# Patient Record
Sex: FEMALE | Race: WHITE | Employment: FULL TIME | URBAN - METROPOLITAN AREA
[De-identification: names, ages, dates, MRNs, and addresses within clinical notes are randomized per-mention and may not be internally consistent; named-entity substitution may affect disease eponyms.]

---

## 2017-01-05 ENCOUNTER — HOSPITAL ENCOUNTER (OUTPATIENT)
Dept: LAB | Age: 62
Discharge: HOME | End: 2017-01-05
Payer: COMMERCIAL

## 2017-01-05 ENCOUNTER — OFFICE VISIT (OUTPATIENT)
Dept: GYNECOLOGY | Age: 62
End: 2017-01-05

## 2017-01-05 VITALS
WEIGHT: 209.6 LBS | BODY MASS INDEX: 32.83 KG/M2 | HEART RATE: 76 BPM | SYSTOLIC BLOOD PRESSURE: 129 MMHG | DIASTOLIC BLOOD PRESSURE: 88 MMHG

## 2017-01-05 DIAGNOSIS — C54.1 ENDOMETRIAL CANCER (HCC): Primary | ICD-10-CM

## 2017-01-05 PROCEDURE — 88175 CYTOPATH C/V AUTO FLUID REDO: CPT | Performed by: OBSTETRICS & GYNECOLOGY

## 2017-01-05 NOTE — PROGRESS NOTES
6 month check up, pt reports no abnormal spotting or bleeding, pt states she has no questions or concerns for today's visit

## 2017-01-05 NOTE — PROGRESS NOTES
27 [facility] [address], [address] [facility]  [phone]  MD Rafael Eng MD  Office Visit    Patient ID:  Name: Elizabeth Keane  MRN: [id number]  : 1955/61 y.o. Visit date: 2017    Primary Diagnosis:     ICD-10-CM ICD-9-CM    1. Endometrial cancer ([facility] 75.) C54.1 182.0 PAP, IG, RFX HPV ASCUS ([id number])        Problem List:    Patient Active Problem List   Diagnosis Code    Endometrial cancer ([facility] C54.1       INTERVAL HISTORY:  Elizabeth Keane is a  female with a history of stage I, grade 2 endometrial carcinoma, diagnosed in 2013, when she underwent TLH, BSO, staging. She was not recommended any adjuvant therapy. She presents today for routine surveillance. She is doing well and is without complaints. She denies any vaginal bleeding or discharge, any pelvic or abdominal pain, or any changes in her bowel or bladder habits. PMH:  Past Medical History   Diagnosis Date    Alopecia     Arthritis      shoulders    Asthma     Cancer ([facility]      ENDOMETRIAL    Edema     Nausea & vomiting     Polyp of colon     Thyroid disease      hypothyroidism     PSH:  Past Surgical History   Procedure Laterality Date    Endoscopy, colon, diagnostic  3/2010    Endoscopy, colon, diagnostic       3cm polyp    Hx cholecystectomy      Hx heent       tonsillectomy    Hx heent       NASAL SURGERY    Hx gyn           Hx gyn       btl    Hx gyn  13     hysteroscopy/D&C    Hx gyn  10/7/13     TLH/BSO    Hx colonoscopy  6/3/15     X1 polyp     SOC:  Social History     Social History    Marital status:      Spouse name: N/A    Number of children: N/A    Years of education: N/A     Occupational History    Not on file. Social History Main Topics    Smoking status: Never Smoker    Smokeless tobacco: Never Used    Alcohol use Yes      Comment: RARELY    Drug use:  No  Sexual activity: Yes     Partners: Male     Other Topics Concern    Not on file     Social History Narrative     Family History  Family History   Problem Relation Age of Onset    Cancer Paternal Grandfather      COLON    Diabetes Father     Heart Disease Father     Hypertension Sister     Diabetes Sister     Lung Disease Sister      Avis Chambers    Thyroid Disease Daughter      Medications:  Current Outpatient Prescriptions on File Prior to Visit   Medication Sig Dispense Refill    Thyroid, Pork, 120 mg tab Take 120 mg by mouth daily.  Cholecalciferol, Vitamin D3, 5,000 unit Tab Take  by mouth.  spironolactone (ALDACTONE) 50 mg tablet Take  by mouth daily.  cyanocobalamin (VITAMIN B-12) 1,000 mcg tablet Take 1,000 mcg by mouth daily.  Thyroid, Pork, (ARMOUR THYROID) 240 mg Tab Take  by mouth daily. No current facility-administered medications on file prior to visit. Allergies: Allergies   Allergen Reactions    Demerol [Meperidine] Other (comments)     nightmares    Erythromycin Itching       ROS:  Negative except that mentioned in HPI    OBJECTIVE:    PHYSICAL EXAM  VITAL SIGNS: Vitals:    01/05/17 1442   BP: 129/88   Pulse: 76   Weight: 209 lb 9.6 oz (95.1 kg)      GENERAL FAUZIA: Conversant, alert, oriented. NAD   HEENT: Normocephalic. Oropharynx clear. Neck: Trachea midline, no JVD, no thyroid enlargement   RESPIRATORY: Lung fields clear to auscultation and percussion. Adequate respiratory effort   CARDIOVASC: RRR without murmur   GASTROINT: soft, non-tender, without masses or organomegaly. No hernia noted   MUSCULOSKEL: Within normal limits   EXTREMITIES: extremities normal, atraumatic, no cyanosis or edema. Pulses appreciated.    PELVIC: External genitalia: normal general appearance  Urinary system: urethral meatus normal  Vaginal: normal without tenderness, induration or masses  Cervix: absent  Adnexa: removed surgically  Uterus: absent   RECTAL: Deferred   AXEL SURVEY: Negative throughout---neck, axillae, inguina. NEURO: Grossly intact, no acute deficit. Oriented. Not depressed. Not agitated. IMPRESSION AND PLAN:  Johnny Albrecht has a diagnosis of stage I, grade 2 endometrial cancer. She has no evidence of disease on today's exam  We will follow up on today's pap smear and I will see her back in the office in 6 months for continued surveillance.       Beverly Schroeder MD  1/5/2017/4:09 PM

## 2017-07-18 ENCOUNTER — HOSPITAL ENCOUNTER (OUTPATIENT)
Dept: LAB | Age: 62
Discharge: HOME | End: 2017-07-18
Payer: COMMERCIAL

## 2017-07-18 ENCOUNTER — OFFICE VISIT (OUTPATIENT)
Dept: GYNECOLOGY | Age: 62
End: 2017-07-18

## 2017-07-18 VITALS
SYSTOLIC BLOOD PRESSURE: 121 MMHG | HEART RATE: 72 BPM | WEIGHT: 214.6 LBS | DIASTOLIC BLOOD PRESSURE: 76 MMHG | BODY MASS INDEX: 33.61 KG/M2

## 2017-07-18 DIAGNOSIS — C54.1 ENDOMETRIAL CANCER (HCC): Primary | ICD-10-CM

## 2017-07-18 PROCEDURE — 88175 CYTOPATH C/V AUTO FLUID REDO: CPT | Performed by: OBSTETRICS & GYNECOLOGY

## 2017-07-18 NOTE — PROGRESS NOTES
27 [facility] [address], [address] [facility]  [phone]  MD Carlos Almaguer MD  Office Visit    Patient ID:  Name: Mick Moore  MRN: [id number]  : 1955/62 y.o. Visit date: 2017    Primary Diagnosis:     ICD-10-CM ICD-9-CM    1. Endometrial cancer ([facility] 75.) C54.1 182.0         Problem List:    Patient Active Problem List   Diagnosis Code    [facility] ([facility] C54.1       INTERVAL HISTORY:  Mick Moore is a  female with a history of stage I, grade 2 endometrial carcinoma, diagnosed in 2013, when she underwent TLH, BSO, staging. She was not recommended any adjuvant therapy. She presents today for routine surveillance. She is doing well and is without complaints. She denies any vaginal bleeding or discharge, any pelvic or abdominal pain, or any changes in her bowel or bladder habits. PMH:  Past Medical History:   Diagnosis Date    Alopecia     Arthritis     shoulders    Asthma     Cancer ([facility]     ENDOMETRIAL    Edema     Nausea & vomiting     Polyp of colon     Thyroid disease     hypothyroidism     PSH:  Past Surgical History:   Procedure Laterality Date    ENDOSCOPY, COLON, DIAGNOSTIC  3/2010    ENDOSCOPY, COLON, DIAGNOSTIC      3cm polyp    HX CHOLECYSTECTOMY      HX COLONOSCOPY  6/3/15    X1 polyp    HX GYN          HX GYN      btl    HX GYN  13    hysteroscopy/D&C    HX GYN  10/7/13    TLH/BSO    HX HEENT      tonsillectomy    HX HEENT      NASAL SURGERY     SOC:  Social History     Social History    Marital status:      Spouse name: N/A    Number of children: N/A    Years of education: N/A     Occupational History    Not on file.      Social History Main Topics    Smoking status: Never Smoker    Smokeless tobacco: Never Used    Alcohol use Yes      Comment: RARELY    Drug use: No    Sexual activity: Yes     Partners: Male     Other Topics Concern    Not on file     Social History Narrative     Family History  Family History   Problem Relation Age of Onset    Cancer Paternal Grandfather      COLON    Diabetes Father     Heart Disease Father     Hypertension Sister     Diabetes Sister     Lung Disease Sister      Vish Shields    Thyroid Disease Daughter      Medications:  Current Outpatient Prescriptions on File Prior to Visit   Medication Sig Dispense Refill    Thyroid, Pork, 120 mg tab Take 120 mg by mouth daily.  cyanocobalamin (VITAMIN B-12) 1,000 mcg tablet Take 1,000 mcg by mouth daily.  Cholecalciferol, Vitamin D3, 5,000 unit Tab Take  by mouth.  spironolactone (ALDACTONE) 50 mg tablet Take  by mouth daily.  Thyroid, Pork, (ARMOUR THYROID) 240 mg Tab Take  by mouth daily. No current facility-administered medications on file prior to visit. Allergies: Allergies   Allergen Reactions    Demerol [Meperidine] Other (comments)     nightmares    Erythromycin Itching       ROS:  Negative except that mentioned in HPI    OBJECTIVE:    PHYSICAL EXAM  VITAL SIGNS: Vitals:    07/18/17 1450   BP: 121/76   Pulse: 72   Weight: 97.3 kg (214 lb 9.6 oz)      GENERAL FAUZIA: Conversant, alert, oriented. NAD   HEENT: Normocephalic. Oropharynx clear. Neck: Trachea midline, no JVD, no thyroid enlargement   RESPIRATORY: Lung fields clear to auscultation and percussion. Adequate respiratory effort   CARDIOVASC: RRR without murmur   GASTROINT: soft, non-tender, without masses or organomegaly. No hernia noted   MUSCULOSKEL: Within normal limits   EXTREMITIES: extremities normal, atraumatic, no cyanosis or edema. Pulses appreciated.    PELVIC: External genitalia: normal general appearance  Urinary system: urethral meatus normal  Vaginal: normal without tenderness, induration or masses  Cervix: absent  Adnexa: removed surgically  Uterus: absent   RECTAL: Deferred   AXEL SURVEY: Negative throughout---neck, axillae, inguina. NEURO: Grossly intact, no acute deficit. Oriented. Not depressed. Not agitated. IMPRESSION AND PLAN:  Roberto Alba has a diagnosis of stage I, grade 2 endometrial cancer. She has no evidence of disease on today's exam  We will follow up on today's pap smear and I will see her back in the office in 6 months for continued surveillance.       Damien Reece., MD  7/18/2017/4:09 PM

## 2018-02-13 ENCOUNTER — HOSPITAL ENCOUNTER (OUTPATIENT)
Dept: LAB | Age: 63
Discharge: HOME | End: 2018-02-13
Payer: COMMERCIAL

## 2018-02-13 ENCOUNTER — OFFICE VISIT (OUTPATIENT)
Dept: GYNECOLOGY | Age: 63
End: 2018-02-13

## 2018-02-13 VITALS
SYSTOLIC BLOOD PRESSURE: 133 MMHG | HEART RATE: 74 BPM | WEIGHT: 208.6 LBS | HEIGHT: 67 IN | BODY MASS INDEX: 32.74 KG/M2 | DIASTOLIC BLOOD PRESSURE: 84 MMHG

## 2018-02-13 DIAGNOSIS — C54.1 ENDOMETRIAL CANCER (HCC): Primary | ICD-10-CM

## 2018-02-13 PROCEDURE — 88175 CYTOPATH C/V AUTO FLUID REDO: CPT | Performed by: OBSTETRICS & GYNECOLOGY

## 2018-02-13 NOTE — PROGRESS NOTES
27 [facility] [address], [address] [facility]  [phone]  MD Vanessa Meade MD  Office Visit    Patient ID:  Name: Neto Santiago  MRN: [id number]  : 1955/62 y.o. Visit date: 2018    Primary Diagnosis:     ICD-10-CM ICD-9-CM    1. Endometrial cancer ([facility].) C54.1 182.0 PAP, IG, RFX HPV ASCUS ([id number])        Problem List:    Patient Active Problem List   Diagnosis Code    [facility] C54.1       INTERVAL HISTORY:  Neto Santiago is a  female with a history of stage I, grade 2 endometrial carcinoma, diagnosed in 2013, when she underwent TLH, BSO, staging. She was not recommended any adjuvant therapy. She presents today for routine surveillance. She is doing well and is without complaints. She denies any vaginal bleeding or discharge, any pelvic or abdominal pain, or any changes in her bowel or bladder habits. PMH:  Past Medical History:   Diagnosis Date    Alopecia     Arthritis     shoulders    Asthma     Cancer ([facility]     ENDOMETRIAL    Edema     Nausea & vomiting     Polyp of colon     Thyroid disease     hypothyroidism     PSH:  Past Surgical History:   Procedure Laterality Date    ENDOSCOPY, COLON, DIAGNOSTIC  3/2010    ENDOSCOPY, COLON, DIAGNOSTIC      3cm polyp    HX CHOLECYSTECTOMY      HX COLONOSCOPY  6/3/15    X1 polyp    HX GYN          HX GYN      btl    HX GYN  13    hysteroscopy/D&C    HX GYN  10/7/13    TLH/BSO    HX HEENT      tonsillectomy    HX HEENT      NASAL SURGERY     SOC:  Social History     Social History    Marital status:      Spouse name: N/A    Number of children: N/A    Years of education: N/A     Occupational History    Not on file.      Social History Main Topics    Smoking status: Never Smoker    Smokeless tobacco: Never Used    Alcohol use Yes      Comment: RARELY    Drug use: No    Sexual activity: Yes     Partners: Male     Other Topics Concern    Not on file     Social History Narrative     Family History  Family History   Problem Relation Age of Onset    Cancer Paternal Grandfather      COLON    Diabetes Father     Heart Disease Father     Hypertension Sister     Diabetes Sister     Lung Disease Sister      Jimbo Ho    Thyroid Disease Daughter      Medications:  Current Outpatient Prescriptions on File Prior to Visit   Medication Sig Dispense Refill    Thyroid, Pork, 120 mg tab Take 120 mg by mouth daily.  cyanocobalamin (VITAMIN B-12) 1,000 mcg tablet Take 1,000 mcg by mouth daily.  Cholecalciferol, Vitamin D3, 5,000 unit Tab Take  by mouth.  spironolactone (ALDACTONE) 50 mg tablet Take  by mouth daily.  Thyroid, Pork, (ARMOUR THYROID) 240 mg Tab Take  by mouth daily. No current facility-administered medications on file prior to visit. Allergies: Allergies   Allergen Reactions    Demerol [Meperidine] Other (comments)     nightmares    Erythromycin Itching       ROS:  Negative except that mentioned in HPI    OBJECTIVE:    PHYSICAL EXAM  VITAL SIGNS: Vitals:    02/13/18 1536   BP: 133/84   Pulse: 74   Weight: 208 lb 9.6 oz (94.6 kg)   Height: 5' 7.01\" (1.702 m)      GENERAL FAUZIA: Conversant, alert, oriented. NAD   HEENT: Normocephalic. Oropharynx clear. Neck: Trachea midline, no JVD, no thyroid enlargement   RESPIRATORY: Lung fields clear to auscultation and percussion. Adequate respiratory effort   CARDIOVASC: RRR without murmur   GASTROINT: soft, non-tender, without masses or organomegaly. No hernia noted   MUSCULOSKEL: Within normal limits   EXTREMITIES: extremities normal, atraumatic, no cyanosis or edema. Pulses appreciated.    PELVIC: External genitalia: normal general appearance  Urinary system: urethral meatus normal  Vaginal: normal without tenderness, induration or masses  Cervix: absent  Adnexa: removed surgically  Uterus: absent   RECTAL: Deferred   AXEL SURVEY: Negative throughout---neck, axillae, inguina. NEURO: Grossly intact, no acute deficit. Oriented. Not depressed. Not agitated. IMPRESSION AND PLAN:  Sera Rosas has a diagnosis of stage I, grade 2 endometrial cancer. She has no evidence of disease on today's exam  We will follow up on today's pap smear and I will see her back in the office in 6 months for continued surveillance.       Nestor Robbins MD  2/13/2018/4:09 PM

## 2018-09-13 ENCOUNTER — HOSPITAL ENCOUNTER (OUTPATIENT)
Dept: LAB | Age: 63
Discharge: HOME | End: 2018-09-13
Payer: COMMERCIAL

## 2018-09-13 ENCOUNTER — OFFICE VISIT (OUTPATIENT)
Dept: GYNECOLOGY | Age: 63
End: 2018-09-13

## 2018-09-13 VITALS
SYSTOLIC BLOOD PRESSURE: 135 MMHG | DIASTOLIC BLOOD PRESSURE: 86 MMHG | HEIGHT: 67 IN | BODY MASS INDEX: 32.65 KG/M2 | WEIGHT: 208 LBS | HEART RATE: 74 BPM

## 2018-09-13 DIAGNOSIS — C54.1 ENDOMETRIAL CANCER (HCC): Primary | ICD-10-CM

## 2018-09-13 PROCEDURE — 88175 CYTOPATH C/V AUTO FLUID REDO: CPT | Performed by: OBSTETRICS & GYNECOLOGY

## 2018-09-13 NOTE — PROGRESS NOTES
Six month check up. Patient states no abnormal spotting or bleeding. 1. Have you been to the ER, urgent care clinic since your last visit? Hospitalized since your last visit? No    2. Have you seen or consulted any other health care providers outside of the [address] [place] [address] since your last visit? Include any pap smears or colon screening.  No

## 2018-09-13 NOTE — PROGRESS NOTES
27 [facility] [address], [address] [facility]  [phone]  MD Sumi Hoffman MD  Office Visit    Patient ID:  Name: Johnny Albrecht  MRN: [id number]  :  y.o. Visit date: 2018    Primary Diagnosis:     ICD-10-CM ICD-9-CM    1. Endometrial cancer ([facility] 75.) C54.1 182.0 PAP, IG, RFX HPV ASCUS ([id number])        Problem List:    Patient Active Problem List   Diagnosis Code    Endometrial cancer ([facility].) C54.1       INTERVAL HISTORY:  Johnny Albrecht is a  female with a history of stage I, grade 2 endometrial carcinoma, diagnosed in 2013, when she underwent TLH, BSO, staging. She was not recommended any adjuvant therapy. She presents today for routine surveillance. She is doing well and is without complaints. She denies any vaginal bleeding or discharge, any pelvic or abdominal pain, or any changes in her bowel or bladder habits. PMH:  Past Medical History:   Diagnosis Date    Alopecia     Arthritis     shoulders    Asthma     [facility] ([facility]     ENDOMETRIAL    Edema     Nausea & vomiting     Polyp of colon     Thyroid disease     hypothyroidism     PSH:  Past Surgical History:   Procedure Laterality Date    ENDOSCOPY, COLON, DIAGNOSTIC  3/2010    ENDOSCOPY, COLON, DIAGNOSTIC      3cm polyp    HX CHOLECYSTECTOMY      HX COLONOSCOPY  6/3/15    X1 polyp    HX GYN          HX GYN      btl    HX GYN  13    hysteroscopy/D&C    HX GYN  10/7/13    TLH/BSO    HX HEENT      tonsillectomy    HX HEENT      NASAL SURGERY     SOC:  Social History     Social History    Marital status:      Spouse name: N/A    Number of children: N/A    Years of education: N/A     Occupational History    Not on file.      Social History Main Topics    Smoking status: Never Smoker    Smokeless tobacco: Never Used    Alcohol use Yes      Comment: RARELY    Drug use: No    Sexual activity: Yes     Partners: Male     Other Topics Concern    Not on file     Social History Narrative     Family History  Family History   Problem Relation Age of Onset    Cancer Paternal Grandfather      COLON    Diabetes Father     Heart Disease Father     Hypertension Sister     Diabetes Sister     Lung Disease Sister      Stoney Hamman    Thyroid Disease Daughter      Medications:  Current Outpatient Prescriptions on File Prior to Visit   Medication Sig Dispense Refill    Thyroid, Pork, 120 mg tab Take 120 mg by mouth daily.  cyanocobalamin (VITAMIN B-12) 1,000 mcg tablet Take 1,000 mcg by mouth daily.  Cholecalciferol, Vitamin D3, 5,000 unit Tab Take  by mouth.  spironolactone (ALDACTONE) 50 mg tablet Take  by mouth daily.  Thyroid, Pork, (ARMOUR THYROID) 240 mg Tab Take  by mouth daily. No current facility-administered medications on file prior to visit. Allergies: Allergies   Allergen Reactions    Demerol [Meperidine] Other (comments)     nightmares    Erythromycin Itching       ROS:  Negative except that mentioned in HPI    OBJECTIVE:    PHYSICAL EXAM  VITAL SIGNS: Vitals:    09/13/18 1546   BP: 135/86   Pulse: 74   Weight: 208 lb (94.3 kg)   Height: 5' 7.01\" (1.702 m)      GENERAL FAUZIA: Conversant, alert, oriented. NAD   HEENT: Normocephalic. Oropharynx clear. Neck: Trachea midline, no JVD, no thyroid enlargement   RESPIRATORY: Lung fields clear to auscultation and percussion. Adequate respiratory effort   CARDIOVASC: RRR without murmur   GASTROINT: soft, non-tender, without masses or organomegaly. No hernia noted   MUSCULOSKEL: Within normal limits   EXTREMITIES: extremities normal, atraumatic, no cyanosis or edema. Pulses appreciated.    PELVIC: External genitalia: normal general appearance  Urinary system: urethral meatus normal  Vaginal: normal without tenderness, induration or masses  Cervix: absent  Adnexa: removed surgically  Uterus: absent   RECTAL: Deferred AXEL SURVEY: Negative throughout---neck, axillae, inguina. NEURO: Grossly intact, no acute deficit. Oriented. Not depressed. Not agitated. IMPRESSION AND PLAN:  Bret Blood has a diagnosis of stage I, grade 2 endometrial cancer. She has no evidence of disease on today's exam  We will follow up on today's pap smear and I will see her back in the office in one year for continued surveillance.       Marlon Lott MD  9/13/2018/4:09 PM

## 2019-09-16 NOTE — PROGRESS NOTES
Check up for history of endometrial cancer. Pt states no abnormal spotting, bleeding or pain. 1. Have you been to the ER, urgent care clinic since your last visit? Hospitalized since your last visit? No     2. Have you seen or consulted any other health care providers outside of the [address] [place] [address] since your last visit? Include any pap smears or colon screening.  No

## 2019-09-17 ENCOUNTER — OFFICE VISIT (OUTPATIENT)
Dept: GYNECOLOGY | Age: 64
End: 2019-09-17

## 2019-09-17 VITALS
HEIGHT: 67 IN | HEART RATE: 84 BPM | BODY MASS INDEX: 34.56 KG/M2 | DIASTOLIC BLOOD PRESSURE: 88 MMHG | WEIGHT: 220.2 LBS | SYSTOLIC BLOOD PRESSURE: 128 MMHG

## 2019-09-17 DIAGNOSIS — C54.1 ENDOMETRIAL CANCER (HCC): Primary | ICD-10-CM

## 2019-09-17 NOTE — PROGRESS NOTES
27 [facility] [address], [address] [facility]  [phone]  MD Yvette Mann MD  Office Visit    Patient ID:  Name: Armin Keenan  MRN: [id number]  : 64 y.o. Visit date: 2019    Primary Diagnosis:     ICD-10-CM ICD-9-CM    1. Endometrial cancer ([facility].) C54.1 182.0         Problem List:    Patient Active Problem List   Diagnosis Code    [facility] C54.1       INTERVAL HISTORY:  Armin Keenan is a  female with a history of stage I, grade 2 endometrial carcinoma, diagnosed in 2013, when she underwent TLH, BSO, staging. She was not recommended any adjuvant therapy. She presents today for routine surveillance. She is doing well and is without complaints. She denies any vaginal bleeding or discharge, any pelvic or abdominal pain, or any changes in her bowel or bladder habits.         PMH:  Past Medical History:   Diagnosis Date    Alopecia     Arthritis     shoulders    Asthma     [facility] ([facility]     ENDOMETRIAL    Edema     Nausea & vomiting     Polyp of colon     Thyroid disease     hypothyroidism     PSH:  Past Surgical History:   Procedure Laterality Date    ENDOSCOPY, COLON, DIAGNOSTIC  3/2010    ENDOSCOPY, COLON, DIAGNOSTIC      3cm polyp    HX CHOLECYSTECTOMY      HX COLONOSCOPY  6/3/15    X1 polyp    HX GYN          HX GYN      btl    HX GYN  13    hysteroscopy/D&C    HX GYN  10/7/13    TLH/BSO    HX HEENT      tonsillectomy    HX HEENT      NASAL SURGERY     SOC:  Social History     Socioeconomic History    Marital status:      Spouse name: Not on file    Number of children: Not on file    Years of education: Not on file    Highest education level: Not on file   Occupational History    Not on file   Social Needs    Financial resource strain: Not on file    Food insecurity:     Worry: Not on file     Inability: Not on file  Transportation needs:     Medical: Not on file     Non-medical: Not on file   Tobacco Use    Smoking status: Never Smoker    Smokeless tobacco: Never Used   Substance and Sexual Activity    Alcohol use: Yes     Comment: RARELY    Drug use: No    Sexual activity: Yes     Partners: Male   Lifestyle    Physical activity:     Days per week: Not on file     Minutes per session: Not on file    Stress: Not on file   Relationships    Social connections:     Talks on phone: Not on file     Gets together: Not on file     Attends Gnosticism service: Not on file     Active member of club or organization: Not on file     Attends meetings of clubs or organizations: Not on file     Relationship status: Not on file    Intimate partner violence:     Fear of current or ex partner: Not on file     Emotionally abused: Not on file     Physically abused: Not on file     Forced sexual activity: Not on file   Other Topics Concern    Not on file   Social History Narrative    Not on file     Family History  Family History   Problem Relation Age of Onset    Cancer Paternal Grandfather         COLON    Diabetes Father     Heart Disease Father     Hypertension Sister     Diabetes Sister     Lung Disease Sister         [facility]    Thyroid Disease Daughter      Medications:  Current Outpatient Medications on File Prior to Visit   Medication Sig Dispense Refill    cyanocobalamin (VITAMIN B-12) 1,000 mcg tablet Take 1,000 mcg by mouth daily.  Cholecalciferol, Vitamin D3, 5,000 unit Tab Take  by mouth.  spironolactone (ALDACTONE) 50 mg tablet Take  by mouth daily.  Thyroid, Pork, (ARMOUR THYROID) 240 mg Tab Take  by mouth daily.  Thyroid, Pork, 120 mg tab Take 120 mg by mouth daily. No current facility-administered medications on file prior to visit. Allergies:   Allergies   Allergen Reactions    Demerol [Meperidine] Other (comments)     nightmares    Erythromycin Itching       ROS:  Negative except that mentioned in HPI    OBJECTIVE:    PHYSICAL EXAM  VITAL SIGNS: Vitals:    09/17/19 1458   BP: 128/88   Pulse: 84   Weight: 220 lb 3.2 oz (99.9 kg)   Height: 5' 7.01\" (1.702 m)      GENERAL FAUZIA: Conversant, alert, oriented. NAD   HEENT: Normocephalic. Oropharynx clear. Neck: Trachea midline, no JVD, no thyroid enlargement   RESPIRATORY: Lung fields clear to auscultation and percussion. Adequate respiratory effort   CARDIOVASC: RRR without murmur   GASTROINT: soft, non-tender, without masses or organomegaly. No hernia noted   MUSCULOSKEL: Within normal limits   EXTREMITIES: extremities normal, atraumatic, no cyanosis or edema. Pulses appreciated. PELVIC: External genitalia: normal general appearance  Urinary system: urethral meatus normal  Vaginal: normal without tenderness, induration or masses  Cervix: absent  Adnexa: removed surgically  Uterus: absent   RECTAL: Deferred   AXEL SURVEY: Negative throughout---neck, axillae, inguina. NEURO: Grossly intact, no acute deficit. Oriented. Not depressed. Not agitated. IMPRESSION AND PLAN:  Kalyan Stahl has a diagnosis of stage I, grade 2 endometrial cancer. She has no evidence of disease on today's exam  We will see her back in the office in one year for continued surveillance.       Kimberly Blackwell MD  9/17/2019/4:09 PM

## 2020-12-10 ENCOUNTER — OFFICE VISIT (OUTPATIENT)
Dept: GYNECOLOGY | Age: 65
End: 2020-12-10
Payer: COMMERCIAL

## 2020-12-10 VITALS
DIASTOLIC BLOOD PRESSURE: 88 MMHG | HEIGHT: 67 IN | BODY MASS INDEX: 34.97 KG/M2 | SYSTOLIC BLOOD PRESSURE: 130 MMHG | HEART RATE: 74 BPM | WEIGHT: 222.8 LBS

## 2020-12-10 DIAGNOSIS — C54.1 ENDOMETRIAL CANCER (HCC): Primary | ICD-10-CM

## 2020-12-10 PROCEDURE — 99214 OFFICE O/P EST MOD 30 MIN: CPT | Performed by: OBSTETRICS & GYNECOLOGY

## 2020-12-10 NOTE — PROGRESS NOTES
[facility] GYNECOLOGIC ONCOLOGY  [address] [facility], [address]  [phone] [facility] [phone]      Office Visit    Patient ID:  Name: Cynthia Tierney  MRN: [id number]  :  y.o. Visit date: 12/10/2020    Primary Diagnosis:     ICD-10-CM ICD-9-CM    1. Endometrial cancer ([facility] [address]  C54.1 182.0         Problem List:    Patient Active Problem List   Diagnosis Code    Endometrial cancer ([facility] [address] C54.1       INTERVAL HISTORY:  Cynthia Tierney is a  female with a history of stage I, grade 2 endometrial carcinoma, diagnosed in 2013, when she underwent TLH, BSO, staging. She was not recommended any adjuvant therapy. She presents today for routine surveillance. She is doing well and is without complaints. She denies any vaginal bleeding or discharge, any pelvic or abdominal pain, or any changes in her bowel or bladder habits.         PMH:  Past Medical History:   Diagnosis Date    Alopecia     Arthritis     shoulders    Asthma     Cancer ([facility] [address]     ENDOMETRIAL    Edema     Nausea & vomiting     Polyp of colon     Thyroid disease     hypothyroidism     PSH:  Past Surgical History:   Procedure Laterality Date    ENDOSCOPY, COLON, DIAGNOSTIC  3/2010    ENDOSCOPY, COLON, DIAGNOSTIC      3cm polyp    HX CHOLECYSTECTOMY      HX COLONOSCOPY  6/3/15    X1 polyp    HX GYN          HX GYN      btl    HX GYN  13    hysteroscopy/D&C    HX GYN  10/7/13    TLH/BSO    HX HEENT      tonsillectomy    HX HEENT      NASAL SURGERY     SOC:  Social History     Socioeconomic History    Marital status:      Spouse name: Not on file    Number of children: Not on file    Years of education: Not on file    Highest education level: Not on file   Occupational History    Not on file   Social Needs    Financial resource strain: Not on file    Food insecurity     Worry: Not on file     Inability: Not on file   [employer] needs Medical: Not on file     Non-medical: Not on file   Tobacco Use    Smoking status: Never Smoker    Smokeless tobacco: Never Used   Substance and Sexual Activity    Alcohol use: Yes     Comment: RARELY    Drug use: No    Sexual activity: Yes     Partners: Male   Lifestyle    Physical activity     Days per week: Not on file     Minutes per session: Not on file    Stress: Not on file   Relationships    Social connections     Talks on phone: Not on file     Gets together: Not on file     Attends [employer] service: Not on file     Active member of club or organization: Not on file     Attends meetings of clubs or organizations: Not on file     Relationship status: Not on file    Intimate partner violence     Fear of current or ex partner: Not on file     Emotionally abused: Not on file     Physically abused: Not on file     Forced sexual activity: Not on file   Other Topics Concern    Not on file   Social History Narrative    Not on file     Family History  Family History   Problem Relation Age of Onset    Cancer Paternal Grandfather         COLON    Diabetes Father     Heart Disease Father     Hypertension Sister     Diabetes Sister     Lung Disease Sister         [facility]    Thyroid Disease Daughter      Medications:  Current Outpatient Medications on File Prior to Visit   Medication Sig Dispense Refill    OTHER       Thyroid, Pork, 120 mg tab Take 120 mg by mouth daily.  cyanocobalamin (VITAMIN B-12) 1,000 mcg tablet Take 1,000 mcg by mouth daily.  Cholecalciferol, Vitamin D3, 5,000 unit Tab Take  by mouth.  spironolactone (ALDACTONE) 50 mg tablet Take  by mouth daily.  Thyroid, Pork, (ARMOUR THYROID) 240 mg Tab Take  by mouth daily. No current facility-administered medications on file prior to visit. Allergies:   Allergies   Allergen Reactions    Demerol [Meperidine] Other (comments)     nightmares    Erythromycin Itching       ROS:  Negative except that mentioned in HPI    OBJECTIVE:    PHYSICAL EXAM  VITAL SIGNS: Vitals:    12/10/20 1403   BP: 130/88   Pulse: 74   Weight: 222 lb 12.8 oz (101.1 kg)   Height: 5' 7\" (1.702 m)      GENERAL FAUZIA: Conversant, alert, oriented. NAD   HEENT: Normocephalic. Oropharynx clear. Neck: Trachea midline, no JVD, no thyroid enlargement   RESPIRATORY: Lung fields clear to auscultation and percussion. Adequate respiratory effort   CARDIOVASC: RRR without murmur   GASTROINT: soft, non-tender, without masses or organomegaly. No hernia noted   MUSCULOSKEL: Within normal limits   EXTREMITIES: extremities normal, atraumatic, no cyanosis or edema. Pulses appreciated. PELVIC: External genitalia: normal general appearance  Urinary system: urethral meatus normal  Vaginal: normal without tenderness, induration or masses  Cervix: absent  Adnexa: removed surgically  Uterus: absent   RECTAL: Deferred   AXEL SURVEY: Negative throughout---neck, axillae, inguina. NEURO: Grossly intact, no acute deficit. Oriented. Not depressed. Not agitated. IMPRESSION AND PLAN:  Jose Armando Zamudio has a diagnosis of stage I, grade 2 endometrial cancer. She has no evidence of disease on today's exam  We will see her back in the office in one year for continued surveillance.       Judy Ibrahim MD  12/10/2020/4:09 PM

## 2020-12-10 NOTE — PROGRESS NOTES
One year follow up for endometrial cancer, pt reports no abnormal spotting or bleeding, pt states she has no questions or concerns for today's visit    1. Have you been to the ER, urgent care clinic since your last visit? Hospitalized since your last visit?  no    2. Have you seen or consulted any other health care providers outside of the [address] [place] [address] since your last visit? Include any pap smears or colon screening.    no

## 2021-06-20 ENCOUNTER — APPOINTMENT (OUTPATIENT)
Dept: CT IMAGING | Age: 66
DRG: 064 | End: 2021-06-20
Attending: EMERGENCY MEDICINE
Payer: COMMERCIAL

## 2021-06-20 ENCOUNTER — HOSPITAL ENCOUNTER (INPATIENT)
Age: 66
LOS: 4 days | Discharge: HOME HEALTH | DRG: 064 | End: 2021-06-24
Attending: EMERGENCY MEDICINE | Admitting: INTERNAL MEDICINE
Payer: COMMERCIAL

## 2021-06-20 DIAGNOSIS — R20.0 FACIAL NUMBNESS: ICD-10-CM

## 2021-06-20 DIAGNOSIS — I65.01 VERTEBRAL ARTERY OCCLUSION, RIGHT: ICD-10-CM

## 2021-06-20 DIAGNOSIS — R29.898 RIGHT LEG WEAKNESS: ICD-10-CM

## 2021-06-20 DIAGNOSIS — I63.011 CEREBROVASCULAR ACCIDENT (CVA) DUE TO THROMBOSIS OF RIGHT VERTEBRAL ARTERY (HCC): Primary | ICD-10-CM

## 2021-06-20 DIAGNOSIS — R20.0 LEFT ARM NUMBNESS: ICD-10-CM

## 2021-06-20 LAB
ALBUMIN SERPL-MCNC: 3.9 G/DL (ref 3.5–5)
ALBUMIN/GLOB SERPL: 1.1 {RATIO} (ref 1.1–2.2)
ALP SERPL-CCNC: 54 U/L (ref 45–117)
ALT SERPL-CCNC: 34 U/L (ref 12–78)
ANION GAP SERPL CALC-SCNC: 13 MMOL/L (ref 5–15)
APTT PPP: 23.7 SEC (ref 22.1–31)
AST SERPL-CCNC: 19 U/L (ref 15–37)
BASOPHILS # BLD: 0 K/UL (ref 0–0.1)
BASOPHILS NFR BLD: 1 % (ref 0–1)
BILIRUB SERPL-MCNC: 0.4 MG/DL (ref 0.2–1)
BUN SERPL-MCNC: 16 MG/DL (ref 6–20)
BUN/CREAT SERPL: 15 (ref 12–20)
CALCIUM SERPL-MCNC: 9 MG/DL (ref 8.5–10.1)
CHLORIDE SERPL-SCNC: 104 MMOL/L (ref 97–108)
CO2 SERPL-SCNC: 26 MMOL/L (ref 21–32)
CREAT SERPL-MCNC: 1.09 MG/DL (ref 0.55–1.02)
DIFFERENTIAL METHOD BLD: NORMAL
EOSINOPHIL # BLD: 0.1 K/UL (ref 0–0.4)
EOSINOPHIL NFR BLD: 2 % (ref 0–7)
ERYTHROCYTE [DISTWIDTH] IN BLOOD BY AUTOMATED COUNT: 13.1 % (ref 11.5–14.5)
GLOBULIN SER CALC-MCNC: 3.4 G/DL (ref 2–4)
GLUCOSE BLD STRIP.AUTO-MCNC: 89 MG/DL (ref 65–117)
GLUCOSE SERPL-MCNC: 84 MG/DL (ref 65–100)
HCT VFR BLD AUTO: 44.6 % (ref 35–47)
HGB BLD-MCNC: 14.3 G/DL (ref 11.5–16)
IMM GRANULOCYTES # BLD AUTO: 0 K/UL (ref 0–0.04)
IMM GRANULOCYTES NFR BLD AUTO: 0 % (ref 0–0.5)
INR PPP: 1.1 (ref 0.9–1.1)
LYMPHOCYTES # BLD: 2.2 K/UL (ref 0.8–3.5)
LYMPHOCYTES NFR BLD: 31 % (ref 12–49)
MAGNESIUM SERPL-MCNC: 1.9 MG/DL (ref 1.6–2.4)
MCH RBC QN AUTO: 30.2 PG (ref 26–34)
MCHC RBC AUTO-ENTMCNC: 32.1 G/DL (ref 30–36.5)
MCV RBC AUTO: 94.1 FL (ref 80–99)
MONOCYTES # BLD: 0.9 K/UL (ref 0–1)
MONOCYTES NFR BLD: 12 % (ref 5–13)
NEUTS SEG # BLD: 4 K/UL (ref 1.8–8)
NEUTS SEG NFR BLD: 54 % (ref 32–75)
NRBC # BLD: 0 K/UL (ref 0–0.01)
NRBC BLD-RTO: 0 PER 100 WBC
PLATELET # BLD AUTO: 222 K/UL (ref 150–400)
PMV BLD AUTO: 11 FL (ref 8.9–12.9)
POTASSIUM SERPL-SCNC: 4.1 MMOL/L (ref 3.5–5.1)
PROT SERPL-MCNC: 7.3 G/DL (ref 6.4–8.2)
PROTHROMBIN TIME: 10.5 SEC (ref 9–11.1)
RBC # BLD AUTO: 4.74 M/UL (ref 3.8–5.2)
SERVICE CMNT-IMP: NORMAL
SODIUM SERPL-SCNC: 143 MMOL/L (ref 136–145)
THERAPEUTIC RANGE,PTTT: NORMAL SECS (ref 58–77)
TSH SERPL DL<=0.05 MIU/L-ACNC: 11.76 UIU/ML (ref 0.36–3.74)
WBC # BLD AUTO: 7.2 K/UL (ref 3.6–11)

## 2021-06-20 PROCEDURE — 36415 COLL VENOUS BLD VENIPUNCTURE: CPT

## 2021-06-20 PROCEDURE — 84443 ASSAY THYROID STIM HORMONE: CPT

## 2021-06-20 PROCEDURE — 85025 COMPLETE CBC W/AUTO DIFF WBC: CPT

## 2021-06-20 PROCEDURE — 80053 COMPREHEN METABOLIC PANEL: CPT

## 2021-06-20 PROCEDURE — 70496 CT ANGIOGRAPHY HEAD: CPT

## 2021-06-20 PROCEDURE — 65610000006 HC RM INTENSIVE CARE

## 2021-06-20 PROCEDURE — 99285 EMERGENCY DEPT VISIT HI MDM: CPT

## 2021-06-20 PROCEDURE — 82962 GLUCOSE BLOOD TEST: CPT

## 2021-06-20 PROCEDURE — 74011250637 HC RX REV CODE- 250/637: Performed by: EMERGENCY MEDICINE

## 2021-06-20 PROCEDURE — 74011250636 HC RX REV CODE- 250/636: Performed by: EMERGENCY MEDICINE

## 2021-06-20 PROCEDURE — 65270000029 HC RM PRIVATE

## 2021-06-20 PROCEDURE — 85730 THROMBOPLASTIN TIME PARTIAL: CPT

## 2021-06-20 PROCEDURE — 83735 ASSAY OF MAGNESIUM: CPT

## 2021-06-20 PROCEDURE — 85610 PROTHROMBIN TIME: CPT

## 2021-06-20 PROCEDURE — 96374 THER/PROPH/DIAG INJ IV PUSH: CPT

## 2021-06-20 PROCEDURE — 93005 ELECTROCARDIOGRAM TRACING: CPT

## 2021-06-20 PROCEDURE — 74011000636 HC RX REV CODE- 636: Performed by: EMERGENCY MEDICINE

## 2021-06-20 PROCEDURE — 70450 CT HEAD/BRAIN W/O DYE: CPT

## 2021-06-20 RX ORDER — ONDANSETRON 4 MG/1
4 TABLET, ORALLY DISINTEGRATING ORAL
Status: DISCONTINUED | OUTPATIENT
Start: 2021-06-20 | End: 2021-06-24 | Stop reason: HOSPADM

## 2021-06-20 RX ORDER — POLYETHYLENE GLYCOL 3350 17 G/17G
17 POWDER, FOR SOLUTION ORAL DAILY PRN
Status: DISCONTINUED | OUTPATIENT
Start: 2021-06-20 | End: 2021-06-24 | Stop reason: HOSPADM

## 2021-06-20 RX ORDER — ACETAMINOPHEN 325 MG/1
650 TABLET ORAL
Status: DISCONTINUED | OUTPATIENT
Start: 2021-06-20 | End: 2021-06-24 | Stop reason: HOSPADM

## 2021-06-20 RX ORDER — HEPARIN SODIUM 10000 [USP'U]/100ML
9-25 INJECTION, SOLUTION INTRAVENOUS
Status: DISCONTINUED | OUTPATIENT
Start: 2021-06-21 | End: 2021-06-21

## 2021-06-20 RX ORDER — ONDANSETRON 2 MG/ML
4 INJECTION INTRAMUSCULAR; INTRAVENOUS
Status: DISCONTINUED | OUTPATIENT
Start: 2021-06-20 | End: 2021-06-24 | Stop reason: HOSPADM

## 2021-06-20 RX ORDER — ASPIRIN 325 MG
325 TABLET ORAL ONCE
Status: COMPLETED | OUTPATIENT
Start: 2021-06-20 | End: 2021-06-20

## 2021-06-20 RX ORDER — SODIUM CHLORIDE 0.9 % (FLUSH) 0.9 %
5-40 SYRINGE (ML) INJECTION AS NEEDED
Status: DISCONTINUED | OUTPATIENT
Start: 2021-06-20 | End: 2021-06-24 | Stop reason: HOSPADM

## 2021-06-20 RX ORDER — SODIUM CHLORIDE 0.9 % (FLUSH) 0.9 %
5-40 SYRINGE (ML) INJECTION EVERY 8 HOURS
Status: DISCONTINUED | OUTPATIENT
Start: 2021-06-21 | End: 2021-06-24 | Stop reason: HOSPADM

## 2021-06-20 RX ORDER — ACETAMINOPHEN 650 MG/1
650 SUPPOSITORY RECTAL
Status: DISCONTINUED | OUTPATIENT
Start: 2021-06-20 | End: 2021-06-24 | Stop reason: HOSPADM

## 2021-06-20 RX ADMIN — HEPARIN SODIUM AND DEXTROSE 9 UNITS/KG/HR: 10000; 5 INJECTION INTRAVENOUS at 23:15

## 2021-06-20 RX ADMIN — IOPAMIDOL 100 ML: 755 INJECTION, SOLUTION INTRAVENOUS at 22:00

## 2021-06-20 RX ADMIN — ASPIRIN 325 MG ORAL TABLET 325 MG: 325 PILL ORAL at 23:00

## 2021-06-21 ENCOUNTER — APPOINTMENT (OUTPATIENT)
Dept: CT IMAGING | Age: 66
DRG: 064 | End: 2021-06-21
Attending: NURSE PRACTITIONER
Payer: COMMERCIAL

## 2021-06-21 ENCOUNTER — APPOINTMENT (OUTPATIENT)
Dept: MRI IMAGING | Age: 66
DRG: 064 | End: 2021-06-21
Attending: NURSE PRACTITIONER
Payer: COMMERCIAL

## 2021-06-21 ENCOUNTER — APPOINTMENT (OUTPATIENT)
Dept: NON INVASIVE DIAGNOSTICS | Age: 66
DRG: 064 | End: 2021-06-21
Attending: PSYCHIATRY & NEUROLOGY
Payer: COMMERCIAL

## 2021-06-21 LAB
ANION GAP SERPL CALC-SCNC: 5 MMOL/L (ref 5–15)
APPEARANCE UR: CLEAR
APTT PPP: 31.9 SEC (ref 22.1–31)
APTT PPP: 37.6 SEC (ref 22.1–31)
APTT PPP: 42.3 SEC (ref 22.1–31)
APTT PPP: 75.8 SEC (ref 22.1–31)
ATRIAL RATE: 61 BPM
ATRIAL RATE: 63 BPM
BACTERIA URNS QL MICRO: NEGATIVE /HPF
BASOPHILS # BLD: 0 K/UL (ref 0–0.1)
BASOPHILS NFR BLD: 1 % (ref 0–1)
BILIRUB UR QL: NEGATIVE
BUN SERPL-MCNC: 14 MG/DL (ref 6–20)
BUN/CREAT SERPL: 16 (ref 12–20)
CALCIUM SERPL-MCNC: 8.7 MG/DL (ref 8.5–10.1)
CALCULATED P AXIS, ECG09: 30 DEGREES
CALCULATED P AXIS, ECG09: 39 DEGREES
CALCULATED R AXIS, ECG10: -2 DEGREES
CALCULATED R AXIS, ECG10: 20 DEGREES
CALCULATED T AXIS, ECG11: 63 DEGREES
CALCULATED T AXIS, ECG11: 63 DEGREES
CHLORIDE SERPL-SCNC: 109 MMOL/L (ref 97–108)
CHOLEST SERPL-MCNC: 185 MG/DL
CO2 SERPL-SCNC: 24 MMOL/L (ref 21–32)
COLOR UR: NORMAL
CREAT SERPL-MCNC: 0.89 MG/DL (ref 0.55–1.02)
DIAGNOSIS, 93000: NORMAL
DIAGNOSIS, 93000: NORMAL
DIFFERENTIAL METHOD BLD: NORMAL
ECHO AO ROOT DIAM: 3.11 CM
ECHO AV AREA PEAK VELOCITY: 3.52 CM2
ECHO AV AREA/BSA PEAK VELOCITY: 1.7 CM2/M2
ECHO AV PEAK GRADIENT: 7.51 MMHG
ECHO AV PEAK VELOCITY: 136.99 CM/S
ECHO LA AREA 4C: 14.54 CM2
ECHO LA MAJOR AXIS: 3.95 CM
ECHO LA MINOR AXIS: 1.85 CM
ECHO LA VOL 2C: 46.84 ML (ref 22–52)
ECHO LA VOL 4C: 33.05 ML (ref 22–52)
ECHO LA VOL BP: 41.42 ML (ref 22–52)
ECHO LA VOL/BSA BIPLANE: 19.45 ML/M2 (ref 16–28)
ECHO LA VOLUME INDEX A2C: 21.99 ML/M2 (ref 16–28)
ECHO LA VOLUME INDEX A4C: 15.52 ML/M2 (ref 16–28)
ECHO LV E' LATERAL VELOCITY: 7.15 CM/S
ECHO LV E' SEPTAL VELOCITY: 5.23 CM/S
ECHO LV INTERNAL DIMENSION DIASTOLIC: 3.86 CM (ref 3.9–5.3)
ECHO LV INTERNAL DIMENSION SYSTOLIC: 2.8 CM
ECHO LV IVSD: 0.89 CM (ref 0.6–0.9)
ECHO LV MASS 2D: 106 G (ref 67–162)
ECHO LV MASS INDEX 2D: 49.8 G/M2 (ref 43–95)
ECHO LV POSTERIOR WALL DIASTOLIC: 0.94 CM (ref 0.6–0.9)
ECHO LVOT DIAM: 2.22 CM
ECHO LVOT PEAK GRADIENT: 6.17 MMHG
ECHO LVOT PEAK VELOCITY: 124.21 CM/S
ECHO MV A VELOCITY: 45.14 CM/S
ECHO MV AREA PHT: 2.35 CM2
ECHO MV E DECELERATION TIME (DT): 322.27 MS
ECHO MV E VELOCITY: 52.75 CM/S
ECHO MV E/A RATIO: 1.17
ECHO MV E/E' LATERAL: 7.38
ECHO MV E/E' RATIO (AVERAGED): 8.73
ECHO MV E/E' SEPTAL: 10.09
ECHO MV PRESSURE HALF TIME (PHT): 93.46 MS
ECHO RV TAPSE: 1.99 CM (ref 1.5–2)
EOSINOPHIL # BLD: 0.1 K/UL (ref 0–0.4)
EOSINOPHIL NFR BLD: 2 % (ref 0–7)
EPITH CASTS URNS QL MICRO: NORMAL /LPF
ERYTHROCYTE [DISTWIDTH] IN BLOOD BY AUTOMATED COUNT: 13.1 % (ref 11.5–14.5)
EST. AVERAGE GLUCOSE BLD GHB EST-MCNC: 120 MG/DL
FACT VIII ACT/NOR PPP: 223 % (ref 80–200)
GLUCOSE BLD STRIP.AUTO-MCNC: 123 MG/DL (ref 65–117)
GLUCOSE SERPL-MCNC: 137 MG/DL (ref 65–100)
GLUCOSE UR STRIP.AUTO-MCNC: NEGATIVE MG/DL
HBA1C MFR BLD: 5.8 % (ref 4–5.6)
HCT VFR BLD AUTO: 40.8 % (ref 35–47)
HDLC SERPL-MCNC: 38 MG/DL
HDLC SERPL: 4.9 {RATIO} (ref 0–5)
HGB BLD-MCNC: 13.2 G/DL (ref 11.5–16)
HGB UR QL STRIP: NEGATIVE
IMM GRANULOCYTES # BLD AUTO: 0 K/UL (ref 0–0.04)
IMM GRANULOCYTES NFR BLD AUTO: 0 % (ref 0–0.5)
KETONES UR QL STRIP.AUTO: NEGATIVE MG/DL
LDLC SERPL CALC-MCNC: 108.4 MG/DL (ref 0–100)
LEUKOCYTE ESTERASE UR QL STRIP.AUTO: NEGATIVE
LYMPHOCYTES # BLD: 2.2 K/UL (ref 0.8–3.5)
LYMPHOCYTES NFR BLD: 35 % (ref 12–49)
MAGNESIUM SERPL-MCNC: 1.8 MG/DL (ref 1.6–2.4)
MCH RBC QN AUTO: 30.2 PG (ref 26–34)
MCHC RBC AUTO-ENTMCNC: 32.4 G/DL (ref 30–36.5)
MCV RBC AUTO: 93.4 FL (ref 80–99)
MONOCYTES # BLD: 0.4 K/UL (ref 0–1)
MONOCYTES NFR BLD: 7 % (ref 5–13)
NEUTS SEG # BLD: 3.6 K/UL (ref 1.8–8)
NEUTS SEG NFR BLD: 55 % (ref 32–75)
NITRITE UR QL STRIP.AUTO: NEGATIVE
NRBC # BLD: 0 K/UL (ref 0–0.01)
NRBC BLD-RTO: 0 PER 100 WBC
P-R INTERVAL, ECG05: 180 MS
P-R INTERVAL, ECG05: 184 MS
PH UR STRIP: 6.5 [PH] (ref 5–8)
PHOSPHATE SERPL-MCNC: 3 MG/DL (ref 2.6–4.7)
PLATELET # BLD AUTO: 186 K/UL (ref 150–400)
PMV BLD AUTO: 10.6 FL (ref 8.9–12.9)
POTASSIUM SERPL-SCNC: 3.6 MMOL/L (ref 3.5–5.1)
PROT UR STRIP-MCNC: NEGATIVE MG/DL
Q-T INTERVAL, ECG07: 422 MS
Q-T INTERVAL, ECG07: 424 MS
QRS DURATION, ECG06: 82 MS
QRS DURATION, ECG06: 84 MS
QTC CALCULATION (BEZET), ECG08: 426 MS
QTC CALCULATION (BEZET), ECG08: 431 MS
RBC # BLD AUTO: 4.37 M/UL (ref 3.8–5.2)
RBC #/AREA URNS HPF: NORMAL /HPF (ref 0–5)
SERVICE CMNT-IMP: ABNORMAL
SODIUM SERPL-SCNC: 138 MMOL/L (ref 136–145)
SP GR UR REFRACTOMETRY: <1.005 (ref 1–1.03)
THERAPEUTIC RANGE,PTTT: ABNORMAL SECS (ref 58–77)
TRIGL SERPL-MCNC: 193 MG/DL (ref ?–150)
UROBILINOGEN UR QL STRIP.AUTO: 0.2 EU/DL (ref 0.2–1)
VENTRICULAR RATE, ECG03: 61 BPM
VENTRICULAR RATE, ECG03: 63 BPM
VLDLC SERPL CALC-MCNC: 38.6 MG/DL
WBC # BLD AUTO: 6.4 K/UL (ref 3.6–11)
WBC URNS QL MICRO: NORMAL /HPF (ref 0–4)

## 2021-06-21 PROCEDURE — 85300 ANTITHROMBIN III ACTIVITY: CPT

## 2021-06-21 PROCEDURE — 99223 1ST HOSP IP/OBS HIGH 75: CPT | Performed by: PSYCHIATRY & NEUROLOGY

## 2021-06-21 PROCEDURE — 0042T CT CODE NEURO PERF W CBF: CPT

## 2021-06-21 PROCEDURE — 36415 COLL VENOUS BLD VENIPUNCTURE: CPT

## 2021-06-21 PROCEDURE — 74011000636 HC RX REV CODE- 636: Performed by: INTERNAL MEDICINE

## 2021-06-21 PROCEDURE — 83735 ASSAY OF MAGNESIUM: CPT

## 2021-06-21 PROCEDURE — 97161 PT EVAL LOW COMPLEX 20 MIN: CPT

## 2021-06-21 PROCEDURE — 97165 OT EVAL LOW COMPLEX 30 MIN: CPT

## 2021-06-21 PROCEDURE — 65660000001 HC RM ICU INTERMED STEPDOWN

## 2021-06-21 PROCEDURE — 85730 THROMBOPLASTIN TIME PARTIAL: CPT

## 2021-06-21 PROCEDURE — 93005 ELECTROCARDIOGRAM TRACING: CPT

## 2021-06-21 PROCEDURE — 74011250637 HC RX REV CODE- 250/637: Performed by: STUDENT IN AN ORGANIZED HEALTH CARE EDUCATION/TRAINING PROGRAM

## 2021-06-21 PROCEDURE — 80061 LIPID PANEL: CPT

## 2021-06-21 PROCEDURE — 65610000006 HC RM INTENSIVE CARE

## 2021-06-21 PROCEDURE — 84100 ASSAY OF PHOSPHORUS: CPT

## 2021-06-21 PROCEDURE — 99222 1ST HOSP IP/OBS MODERATE 55: CPT | Performed by: RADIOLOGY

## 2021-06-21 PROCEDURE — 97530 THERAPEUTIC ACTIVITIES: CPT

## 2021-06-21 PROCEDURE — 82962 GLUCOSE BLOOD TEST: CPT

## 2021-06-21 PROCEDURE — 85240 CLOT FACTOR VIII AHG 1 STAGE: CPT

## 2021-06-21 PROCEDURE — 85732 THROMBOPLASTIN TIME PARTIAL: CPT

## 2021-06-21 PROCEDURE — 81001 URINALYSIS AUTO W/SCOPE: CPT

## 2021-06-21 PROCEDURE — 83036 HEMOGLOBIN GLYCOSYLATED A1C: CPT

## 2021-06-21 PROCEDURE — 85302 CLOT INHIBIT PROT C ANTIGEN: CPT

## 2021-06-21 PROCEDURE — 97116 GAIT TRAINING THERAPY: CPT

## 2021-06-21 PROCEDURE — 93306 TTE W/DOPPLER COMPLETE: CPT

## 2021-06-21 PROCEDURE — 74011250636 HC RX REV CODE- 250/636: Performed by: NURSE PRACTITIONER

## 2021-06-21 PROCEDURE — 81241 F5 GENE: CPT

## 2021-06-21 PROCEDURE — 85613 RUSSELL VIPER VENOM DILUTED: CPT

## 2021-06-21 PROCEDURE — 97535 SELF CARE MNGMENT TRAINING: CPT

## 2021-06-21 PROCEDURE — 80048 BASIC METABOLIC PNL TOTAL CA: CPT

## 2021-06-21 PROCEDURE — 74011250637 HC RX REV CODE- 250/637: Performed by: NURSE PRACTITIONER

## 2021-06-21 PROCEDURE — APPNB180 APP NON BILLABLE TIME > 60 MINS: Performed by: NURSE PRACTITIONER

## 2021-06-21 PROCEDURE — 70496 CT ANGIOGRAPHY HEAD: CPT

## 2021-06-21 PROCEDURE — 70551 MRI BRAIN STEM W/O DYE: CPT

## 2021-06-21 PROCEDURE — 85025 COMPLETE CBC W/AUTO DIFF WBC: CPT

## 2021-06-21 PROCEDURE — 85305 CLOT INHIBIT PROT S TOTAL: CPT

## 2021-06-21 PROCEDURE — 86147 CARDIOLIPIN ANTIBODY EA IG: CPT

## 2021-06-21 PROCEDURE — 86146 BETA-2 GLYCOPROTEIN ANTIBODY: CPT

## 2021-06-21 PROCEDURE — 85598 HEXAGNAL PHOSPH PLTLT NEUTRL: CPT

## 2021-06-21 PROCEDURE — 85303 CLOT INHIBIT PROT C ACTIVITY: CPT

## 2021-06-21 PROCEDURE — 4A03X5D MEASUREMENT OF ARTERIAL FLOW, INTRACRANIAL, EXTERNAL APPROACH: ICD-10-PCS | Performed by: HOSPITALIST

## 2021-06-21 PROCEDURE — 70450 CT HEAD/BRAIN W/O DYE: CPT

## 2021-06-21 RX ORDER — HEPARIN SODIUM 10000 [USP'U]/100ML
9-25 INJECTION, SOLUTION INTRAVENOUS
Status: DISCONTINUED | OUTPATIENT
Start: 2021-06-21 | End: 2021-06-22

## 2021-06-21 RX ORDER — HEPARIN SODIUM 10000 [USP'U]/100ML
9-25 INJECTION, SOLUTION INTRAVENOUS
Status: DISCONTINUED | OUTPATIENT
Start: 2021-06-21 | End: 2021-06-21

## 2021-06-21 RX ORDER — LEVOTHYROXINE AND LIOTHYRONINE 38; 9 UG/1; UG/1
90 TABLET ORAL DAILY
Status: DISCONTINUED | OUTPATIENT
Start: 2021-06-21 | End: 2021-06-24 | Stop reason: HOSPADM

## 2021-06-21 RX ORDER — SODIUM CHLORIDE, SODIUM LACTATE, POTASSIUM CHLORIDE, CALCIUM CHLORIDE 600; 310; 30; 20 MG/100ML; MG/100ML; MG/100ML; MG/100ML
75 INJECTION, SOLUTION INTRAVENOUS CONTINUOUS
Status: DISCONTINUED | OUTPATIENT
Start: 2021-06-21 | End: 2021-06-24 | Stop reason: HOSPADM

## 2021-06-21 RX ORDER — ATORVASTATIN CALCIUM 40 MG/1
40 TABLET, FILM COATED ORAL
Status: DISCONTINUED | OUTPATIENT
Start: 2021-06-21 | End: 2021-06-24 | Stop reason: HOSPADM

## 2021-06-21 RX ORDER — LEVOTHYROXINE AND LIOTHYRONINE 57; 13.5 UG/1; UG/1
90 TABLET ORAL DAILY
COMMUNITY

## 2021-06-21 RX ADMIN — SODIUM CHLORIDE, POTASSIUM CHLORIDE, SODIUM LACTATE AND CALCIUM CHLORIDE 75 ML/HR: 600; 310; 30; 20 INJECTION, SOLUTION INTRAVENOUS at 13:11

## 2021-06-21 RX ADMIN — LEVOTHYROXINE, LIOTHYRONINE 90 MG: 38; 9 TABLET ORAL at 09:33

## 2021-06-21 RX ADMIN — ATORVASTATIN CALCIUM 40 MG: 40 TABLET, FILM COATED ORAL at 21:14

## 2021-06-21 RX ADMIN — Medication 10 ML: at 13:17

## 2021-06-21 RX ADMIN — IOPAMIDOL 100 ML: 755 INJECTION, SOLUTION INTRAVENOUS at 11:49

## 2021-06-21 RX ADMIN — IOPAMIDOL 20 ML: 755 INJECTION, SOLUTION INTRAVENOUS at 11:48

## 2021-06-21 NOTE — CONSULTS
Neurointerventional Surgery Consult  Darcie Jaquez NP    Patient: Leida Benson MRN: [id number]  SSN: [id number]    YOB: 1955  Age: 61 y.o. Sex: female      Chief Complaint: dizziness     Subjective:      Leida Benson is a 61 y.o. female with a pmhx of hypothyroidism, asthma and arthritis who presented to the ED on 6/20/21 for dizziness and sensory changes to the left side of her body. She reports she woke yesterday in her usual state of health and attended Taoist as she usually does on Sundays. After Taoist she noticed she was walking and her body was pulling her to the right. She reports she had to hold onto the shopping cart much like a walker because she felt she would walk to the right if she didn't. She denied any focal weakness, syncope, visual disturbances, or speech difficulties. She felt her symptoms were related to some kind of vertigo so she tried taking a nap which was unsuccessful in decreasing her dizziness. She also reports feeling her posterior left back was very hot against the couch, but did not experience this same sensation on the right. At the time of our exam she feels her left face, arm, and leg are all very warm and feel different than her right. A code stroke was called and CT head was performed showing no acute abnormality. 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 She denies any neck pain, trauma, recent MVC, or chiropractic appointments. She was started on a heparin gtt, placed in a cervical collar, and transferred to [facility] ICU for further monitoring and management.      Past Medical History:   Diagnosis Date    Alopecia     Arthritis     shoulders    Asthma     Cancer ([facility] [address] 2013    ENDOMETRIAL    Edema     Nausea & vomiting     Polyp of colon     Thyroid disease     hypothyroidism     Family History   Problem Relation Age of Onset    Cancer Paternal Grandfather         COLON    Diabetes Father     Heart Disease Father     Hypertension Sister     Diabetes Sister     Lung Disease Sister         Ronni Patel    Thyroid Disease Daughter      Social History     Tobacco Use    Smoking status: Never Smoker    Smokeless tobacco: Never Used   Substance Use Topics    Alcohol use: Yes     Comment: RARELY      Prior to Admission Medications   Prescriptions Last Dose Informant Patient Reported? Taking? Cholecalciferol, Vitamin D3, 5,000 unit Tab   Yes No   Sig: Take  by mouth. OTHER   Yes No   Thyroid, Pork, (ARMOUR THYROID) 240 mg Tab   Yes No   Sig: Take  by mouth daily. Thyroid, Pork, 120 mg tab   Yes No   Sig: Take 120 mg by mouth daily. cyanocobalamin (VITAMIN B-12) 1,000 mcg tablet   Yes No   Sig: Take 1,000 mcg by mouth daily. spironolactone (ALDACTONE) 50 mg tablet   Yes No   Sig: Take  by mouth daily. Facility-Administered Medications: None       Allergies   Allergen Reactions    Demerol [Meperidine] Other (comments)     nightmares    Erythromycin Itching       Review of Systems:  Pertinent items are noted in the History of Present Illness. Denies numbness, tingling, chest pain, leg pain, nausea, vomiting, difficulty swallowing, headache, and dyspnea. Objective:     Vitals:    06/21/21 0300 06/21/21 0400 06/21/21 0500 06/21/21 0600   BP: 129/85 131/81 113/61 (!) 103/51   Pulse: (!) 58 62 66 66   Resp: 15 15 14 16   Temp:  97.5 °F (36.4 °C)     SpO2: 95% 94% 94% 92%   Weight:          Physical Exam:  GENERAL: Calm, cooperative, NAD, [place] collar in place   SKIN: Warm, dry, color appropriate for ethnicity. Neurologic Exam:  Mental Status:  Alert and oriented x 4. Appropriate affect, mood and behavior. Language:    Normal fluency, repetition, comprehension and naming. Cranial Nerves:   Right pupil 3 mm, left 4mm, both round and reactive to light.      Visual fields full to confrontation. Extraocular movements intact. Facial sensation intact. Full facial strength, no asymmetry. Hearing grossly intact bilaterally. No dysarthria. Tongue protrudes to midline, palate elevates symmetrically. Shoulder shrug 5/5 bilaterally. Motor:    No pronator drift. Bulk and tone normal.      5/5 power in all extremities proximally and distally. No involuntary movements. Sensation:    Sensation intact throughout to light touch. Does report subjective temperature changes on left side. Coordination & Gait: Gait deferred. FTN and HTS intact with no ataxia present. Labs:  Lab Results   Component Value Date/Time    WBC 6.4 06/21/2021 05:16 AM    HGB 13.2 06/21/2021 05:16 AM    HCT 40.8 06/21/2021 05:16 AM    PLATELET 890 18/54/7983 05:16 AM    MCV 93.4 06/21/2021 05:16 AM      Lab Results   Component Value Date/Time    Sodium 138 06/21/2021 05:16 AM    Potassium 3.6 06/21/2021 05:16 AM    Chloride 109 (H) 06/21/2021 05:16 AM    CO2 24 06/21/2021 05:16 AM    Anion gap 5 06/21/2021 05:16 AM    Glucose 137 (H) 06/21/2021 05:16 AM    BUN 14 06/21/2021 05:16 AM    Creatinine 0.89 06/21/2021 05:16 AM    BUN/Creatinine ratio 16 06/21/2021 05:16 AM    GFR est AA >60 06/21/2021 05:16 AM    GFR est non-AA >60 06/21/2021 05:16 AM    Calcium 8.7 06/21/2021 05:16 AM       Imaging:  CT Results (maximum last 3): Results from Hospital Encounter encounter on 06/20/21    CTA CODE NEURO HEAD AND NECK W CONT    Narrative  **PRELIMINARY REPORT**    Nonocclusive thrombus in the right vertebral artery. No acute intracranial  thrombosis or significant intracranial stenosis. Preliminary report was provided by Dr. Guero Waller, the on-call radiologist, and  called to Dr. Burnette Resides in the emergency department at 10:29 PM.    Final report to follow.     **END PRELIMINARY REPORT**      CT CODE NEURO HEAD WO CONTRAST    Narrative  INDICATION: Left-sided weakness and numbness    TECHNIQUE: 5 mm axial images were obtained from the skull base through the  vertex. CT dose reduction was achieved through use of a standardized protocol  tailored for this examination and automatic exposure control for dose  modulation. FINDINGS: The ventricles and cortical sulci are appropriate in size and  configuration. There is no evidence of intracranial hemorrhage, mass, mass  effect, or acute infarct. No extra-axial fluid collections are seen. The  visualized paranasal sinuses and mastoid air cells are clear. The orbital  structures are unremarkable. No osseous abnormalities are seen. Impression  No acute intracranial process. Assessment:     Hospital Problems  Date Reviewed: 12/10/2020        Codes Class Noted POA    Vertebral artery occlusion, right ICD-10-CM: I65.01  ICD-9-CM: 433.20  6/20/2021 Unknown            Plan:   Right vertebral artery thrombus   - As seen on CTA H/N 6/ 20/ 21. Nonocculsive. - Heparin gtt x 3 days with PTT goal 60-80; no bolus   - Repeat CTA in 3 days   - MRI to r/o any acute ischemia   - Q1 hour neuro checks; d/t length of thrombus and central location within vessel she is at high risk for deterioration should it move distally   - Soft cervical collar to decrease neck ROM and osteophyte irritation on vessel wall    - Please call NIS, code stroke, and repeat CT/CTA with any acute changes       I have discussed the diagnosis and the intended plan as seen in the above orders with Dr. Dannial Merlin, further recommendations to follow. Thank you for this consult and participating in the care of this patient.   Signed By: Eddie Colvin NP     June 21, 2021

## 2021-06-21 NOTE — ED PROVIDER NOTES
HPI   53-year-old female presents with weakness. Patient states she went to Worship this morning and staying in the choir. She states she got in her car after Worship and then when she went to get out of her car around 12:30 PM she noted she was leaning to 1 side when she was walking. She thought the symptoms would go away and thought her symptoms were due to vertigo. She denies any room spinning sensation. She also complained of a decreased sensation when she was drinking soda on the left side of her mouth where it felt numb. She states the left side of her body just does not feel right. Earlier today she also had a sinus pressure headache on the right side of her face. Denies fever, chills, vomiting, diarrhea, chest pain, shortness of breath. Patient is not on any blood thinners. No history of similar symptoms. Complains of mild nausea.   Past Medical History:   Diagnosis Date    Alopecia     Arthritis     shoulders    Asthma     Cancer ([facility] [address]     ENDOMETRIAL    Edema     Nausea & vomiting     Polyp of colon     Thyroid disease     hypothyroidism       Past Surgical History:   Procedure Laterality Date    ENDOSCOPY, COLON, DIAGNOSTIC  3/2010    ENDOSCOPY, COLON, DIAGNOSTIC      3cm polyp    HX CHOLECYSTECTOMY      HX COLONOSCOPY  6/3/15    X1 polyp    HX GYN          HX GYN      btl    HX GYN  13    hysteroscopy/D&C    HX GYN  10/7/13    TLH/BSO    HX HEENT      tonsillectomy    HX HEENT      NASAL SURGERY         Family History:   Problem Relation Age of Onset    Cancer Paternal Grandfather         COLON    Diabetes Father     Heart Disease Father     Hypertension Sister     Diabetes Sister     Lung Disease Sister         EMPHASEMA    Thyroid Disease Daughter        Social History     Socioeconomic History    Marital status:      Spouse name: Not on file    Number of children: Not on file    Years of education: Not on file   Sidney Butler education level: Not on file   Occupational History    Not on file   Tobacco Use    Smoking status: Never Smoker    Smokeless tobacco: Never Used   Substance and Sexual Activity    Alcohol use: Yes     Comment: RARELY    Drug use: No    Sexual activity: Yes     Partners: Male   Other Topics Concern    Not on file   Social History Narrative    Not on file     Social Determinants of Health     Financial Resource Strain:     Difficulty of Paying Living Expenses:    Food Insecurity:     Worried About Running Out of Food in the Last Year:     [address] in the Last Year:    Transportation Needs:     Lack of Transportation (Medical):  Lack of Transportation (Non-Medical):    Physical Activity:     Days of Exercise per Week:     Minutes of Exercise per Session:    Stress:     Feeling of Stress :    Social Connections:     Frequency of Communication with Friends and Family:     Frequency of Social Gatherings with Friends and Family:     Attends Synagogue Services:     Active Member of Clubs or Organizations:     Attends Club or Organization Meetings:     Marital Status:    Intimate Partner Violence:     Fear of Current or Ex-Partner:     Emotionally Abused:     Physically Abused:     Sexually Abused: ALLERGIES: Demerol [meperidine] and Erythromycin    Review of Systems   Constitutional: Negative for chills and fever. Respiratory: Negative for cough and shortness of breath. Cardiovascular: Negative for chest pain. Gastrointestinal: Negative for abdominal pain, diarrhea, nausea and vomiting. Genitourinary: Negative for dysuria. Musculoskeletal: Positive for gait problem. Skin: Negative for rash. Neurological: Positive for weakness, numbness and headaches. All other systems reviewed and are negative.       Vitals:    06/20/21 2151   BP: (!) 163/97   Pulse: 68   Resp: 16   Temp: 97.6 °F (36.4 °C)   SpO2: 98%   Weight: 106.5 kg (234 lb 12.6 oz)            Physical Exam Physical Examination: General appearance - alert, well appearing, and in no distress, oriented to person, place, and time and normal appearing weight  Eyes - pupils equal and reactive, extraocular eye movements intact  Neck - supple, no significant adenopathy  Chest - clear to auscultation, no wheezes, rales or rhonchi, symmetric air entry  Heart - normal rate, regular rhythm, normal S1, S2, no murmurs, rubs, clicks or gallops  Abdomen - soft, nontender, nondistended, no masses or organomegaly  Back exam - full range of motion, no tenderness, palpable spasm or pain on motion  Neurological - alert, oriented, normal speech, no focal findings or movement disorder noted  Musculoskeletal - no joint tenderness, deformity or swelling  Extremities - peripheral pulses normal, no pedal edema, no clubbing or cyanosis  Skin - normal coloration and turgor, no rashes, no suspicious skin lesions noted  MDM  Number of Diagnoses or Management Options     Amount and/or Complexity of Data Reviewed  Clinical lab tests: ordered and reviewed  Tests in the radiology section of CPT®: ordered and reviewed  Decide to obtain previous medical records or to obtain history from someone other than the patient: yes  Review and summarize past medical records: yes  Discuss the patient with other providers: yes (Teleneurology, neurointerventional radiology, hospitalist)  Independent visualization of images, tracings, or specimens: yes    Patient Progress  Patient progress: improved         Procedures  ED EKG interpretation:  Rhythm: normal sinus rhythm; and regular . Rate (approx.): 61; Axis: normal; P wave: normal; QRS interval: normal ; ST/T wave: non-specific changes;   EKG documented by Pierre Mathews MD    9:58 PM  Discussed with teleneurology. He will evaluate patient. 10:39 PM  Discussed with neuro interventional radiology, Dr. Jaime Gaxiola. She will review CTA and call back with recommendations. 10:49 PM  Discussed with teleneurology. Recommends aspirin and admission for CVA work up. Consider heparin gtt if no intervention plan, no bolus with low PTT goal (50-70). Total critical care time spent exclusive of procedures:  60 min     11:05 PM  Discussed with Dr. Jose Steward, neurointerventional radiology. Concern for osteophyte in the cervical spinal region compressing on right vertebral artery where the clot is. Recommends placing patient in soft cervical collar. She recommends starting heparin drip without a bolus. She recommends ICU admission. Will discuss with intensivist.    11:09 PM  Discussed with Ruth Vazquez NP for ICU.  Will admit under Dr. Mandi Fonseca.

## 2021-06-21 NOTE — PROGRESS NOTES
0100: Report received from [place] ED by Mercy Landon RN.     7282: Patient arrives to ICU via [facility] on a heparin gtt. Patient states, \" left side feels warm in comparison to the right side. \" Left pupil 4mm right pupil 3mm. 0245: Neuro and Intensivist NPs at bedside.      Primary Nurse Jose Antonio Patterson and Tyshawn Champion RN performed a dual skin assessment on this patient No impairment noted  Domingo score is 22

## 2021-06-21 NOTE — ED TRIAGE NOTES
Patient arrives with c/o dizziness around 3pm. Patient states she walked out of a car and felt she was leaning towards her right side. States she had some ice and could not feel the ice on her left side of her mouth. MD at bedside.

## 2021-06-21 NOTE — PROGRESS NOTES
Neurocritical Care Code Stroke Documentation  Examined patient this morning. Patient is a 19-year-old who was transferred from [place] ED overnight due to complaints of dizziness and sensory changes on the left side of her body. A CT of the head was performed which showed no acute abnormality. CT of the head and neck was then performed showing a nonocclusive thrombus in the right vertebral artery with an osteophyte in close proximity compressing the right vertebral artery where the clot originates. She denies any neck pain, headache, vision changes, chest pain, shortness of breath, tingling, coordination issues. She also feels like the left side of her body is warmer than the right which is not new. She does report that she still feels like she is leaning to the right when standing. She denies any weakness, nausea, or vomiting. Denies any speech difficulty. Patient reports a week ago on Sunday she had a transient episode of visual disturbances in her left eye that lasted for about an hour where she noticed a \"pear shape with sparkles\" in her vision, like a \"kaleidoscope. \" She did not seek medical attention at the time. She also reported she experienced a similar episode of visual disturbances in her left eye a month ago prior to this and symptoms lasted for 10 minutes. She did not seek medical attention at that time as well. She has no known prior history of COVID-infection. She recently received the [employer] Covid vaccination last month. She has no prior history of stroke. She reports her dad recently passed in early June from sepsis, but he has a history of recurrent multiple strokes. She denies any family history of clotting disorders. On exam, I noted a right leg drift and sensory changes on the right upper cheek compared to the left cheek. She is also complaining of numbness to her upper lip which is new since 7:30 AM this morning per patient.   On exam initially, she reported that there was decreased sensation to her right arm with pinprick compared to the left arm. However, with further examination with Dr. Deloris Falcon she reported decreased sensation to her left arm compared to the right arm. I called a code stroke at 11:15 AM this morning due to neuro changes. Symptoms:   right-sided facial numbness and numbness to upper lip, right leg weakness, sensory changes in left arm, but initially noted sensory changes in right arm on exam.   Last Known Well: 0730 am this morning    Medical hx: Active Problems:    Vertebral artery occlusion, right (2021)    Hypothyroidism, asthma, arthritis   Anticoagulation: On Heparin drip   VAN:   Negative   NIHSS:   1a-LOC:0    1b-Month/Age:0    1c-Open/Close Hand:0    2-Best Gaze:0    3-Visual Fields:0    4-Facial Palsy:0    5a-Left Arm:0    5b-Right Arm:0    6a-Left Le    6b-Right Le    7-Limb Ataxia:0    8-Sensory:1    9-Best Language:0    10-Dysarthria:0    11-Extinction/Inattention:0  TOTAL SCORE:2   Imaging:   CT: IMPRESSION  No evidence of acute intracranial abnormality. CTA/CTP: IMPRESSION  Unchanged nonocclusive intraluminal filling defect in the V2 segment of the  right vertebral artery. No carotid stenosis. No perfusion abnormality. Plan:   TPA Candidate: NO    Mechanical thrombectomy Candidate: CTA/CTP pending    Dr. Deloris Falcon present in room after code stroke called. Teleneuro not needed. Will repeat CT/CTA/CTP to assess for any interval changes. Patient still needs MRI to assess for acute ischemia. General: NAD, appears stated age  Neurologic Exam:  Mental Status:  Alert and oriented x 4. Appropriate affect, mood and behavior. Language:    Normal fluency, repetition, comprehension and naming. Cranial Nerves:        Pupils equal, round and reactive to light. Visual fields full to confrontation. Extraocular movements intact.        Decreased facial sensation on right upper cheek and decreased sensation to upper lip     Full facial strength, no asymmetry. Hearing grossly intact bilaterally. No dysarthria. Tongue protrudes to midline, palate elevates symmetrically. Shoulder shrug 5/5 bilaterally. Motor:               Right lower extremity drift. Strength 4+/5 in RLE with extension of leg. LUE/RUE/LLE 5/5 strength throughout. Bulk and tone normal.      No involuntary movements. Sensation:    Decreased facial sensation on right upper cheek and decreased sensation to upper lip. Initially noted some decreased sensation to right arm with pinprick compared to left arm, but after further evaluation patient was noted to have decreased sensation to left arm with pinprick compared to right arm, otherwise sensation intact. Coordination & Gait: No ataxia with finger-to-nose and heel-to-shin bilaterally. Gait deferred. Assessment/Plan:  Right vertebral artery non-occlusive thrombus   - Repeat CT/CTA/CTP as stated above  - Continue heparin drip, no boluses, last PTT level this morning was therapeutic at 75.8  - Check hypercoagulable panel in the setting of vertebral artery thrombus  - MRI brain without contrast today to assess for acute ischemia  - Lipid panel shows .4, tryglycerides are elevated at 193, goal less than 70, will start Lipitor 40 mg nightly  - Hemoglobin A1c okay at 5.8  - Obtain ECHO  - Neuro checks every hour  - Neurology following  - Start LR at 100 ml/hr for hydration  - SBP goal 120-180 for permissive HTN in the setting of possible acute CVA with right vertebral artery thrombus      Discussed with Dr. Serena Smith, Dr. Nate Landis, RN, and patient. Time spent: 75 minutes. Ioana Purcell NP  Neurocritical Care Nurse Practitioner  [phone]    Addendum 0120: Patient noted to have some trace peripheral BUE edema and some nonpitting edema in BLE. IV fluids reduced to 75 ml/hr. ECHO results pending. Patient does report she has a baseline heart murmur.

## 2021-06-21 NOTE — PROGRESS NOTES
Orders received, chart reviewed and patient evaluated by occupational therapy. Pending progression with skilled acute occupational therapy, recommend:  Occupational therapy at least 2 days/week in the home  vs. none    Recommend with nursing ADLs with supervision/setup, OOB to chair 3x/day and toileting via functional mobility to and from bathroom with 1 assist. Thank you for completing as able in order to maintain patient strength, endurance and independence. Full evaluation to follow.

## 2021-06-21 NOTE — CONSULTS
[address]    Name:  Nissa Islas  MR#:  [id number]  :  1955  ACCOUNT #:  [de-identified]  DATE OF SERVICE:  2021      NEUROLOGY CONSULTATION    HISTORY OF PRESENT ILLNESS:  This 64-year right-handed female who was in her normal state of good health yesterday until around 12:30 was getting out the car and noted she was trying to lean to the right and had numbness inside her left mouth. On further clarification with the patient, she says things felt warmer on the left half of her body than the right half, not necessarily numb. She thought perhaps this leaning to the right was due to vertigo. She took a nap, woke up, still felt the same, and presented to ED. CT of the head was negative. Her CTA of the head and neck showed, by preliminary report, a nonocclusive thrombus in the right vertebral artery noted by NIS to have an osteophyte in close proximity to this vessel. There is no mention of other intracranial atherosclerosis or stenosis. NIS started the patient on a heparin drip last night and recommended continuing this for three days and then getting a repeat CTA on day three. Her MRI of the brain is pending. Today just prior to my seeing her, the patient was noted to have right lower extremity weakness that was mild and complained of sensory changes on the right side of her face, which was a change compared to her admission; therefore, a code stroke was called and repeat CT of the head and CTA of the head and neck was performed. There was no acute hemorrhage on CT, and CTA is pending. The patient denies a history of diabetes. No history of hypertension, no hypercholesterolemia, no smoking, no known obstructive sleep apnea. She does have family history of six strokes in her father. The patient does not take antiplatelet therapy or anticoagulation at home. She has been taking two Advil three times a day for left hip pain recently.   Blood work has included a CMP and CBC, which were unremarkable and a TSH which was elevated at 11.76. She does have a history of hypothyroidism and is on thyroid replacement therapy. The patient denies prior history of clotting disorder. PAST MEDICAL HISTORY:  1. Asthma. 2.  Osteoarthritis. 3.  Endometrial cancer, status post total laparoscopic hysterectomy and bilateral salpingo-oophorectomy without adjuvant therapy recommended. 4.  Hypothyroidism. 5.  Status post cholecystectomy. 6.  Status post . 7.  Bilateral tubal ligation. 8.  Tonsillectomy. 9.  Nasal surgery. REVIEW OF SYSTEMS:  As per past medical history or HPI, otherwise, reviewed and negative. MEDICATIONS:  At home:  1. Vitamin D.  2.  Vitamin B12.  3.  [place] Thyroid. 4.  Spirolactone. Here she has been started on a heparin drip. ALLERGIES:  TO DEMEROL AND ERYTHROMYCIN. SOCIAL HISTORY:  She is . She is an . No tobacco.  Rare alcohol. No drug use. FAMILY HISTORY:  Dad with six strokes, diabetes, heart disease. Sister, diabetes, hypertension. Daughter with thyroid disease. PHYSICAL EXAMINATION:  VITAL SIGNS:  Blood pressure 127/83, pulse 71, respiratory rate 17, satting 98% on room air, temperature is 98.1, BMI of 35. GENERAL:  She is A well-nourished, well-developed obese female lying in bed in no distress. HEART:  Has regular rate and rhythm without murmurs. Carotids are 2+. No bruits. EXTREMITIES:  Warm. No edema. She has 2+ radial pulses. NEUROLOGIC:  Mental status:  Alert, oriented x4. Speech and language are intact. Attention, memory, and fund of knowledge are appropriate. Cranial nerve examination:  No facial asymmetry or ptosis. Her extraocular eye movements are intact without diplopia or nystagmus. Her visual fields are full. Pupils, left is slightly larger than right but equally reactive, approximately within physiological range of 1-mm difference. This was present on admission.   Her strength and hearing are intact. Sensation:  She reported decrease in her left face and V2 and across her upper lip bilaterally. Her hearing was intact. Motor exam:  She has 5/5 strength throughout except right lower extremity, is 5-/5 with slight drift, does not drift to the table. No pronator drift in the upper extremities. No tremors. Her sensory exam was decreased to pinprick on the right arm compared to left arm. Coordination was intact to finger-to-nose and rapid alternating movements. Heel-to-shin was not attempted. Gait not assessed at this time due to patient factors. Reflexes:  She had 1+ right knee. I was unable to elicit other reflexes. Her toes are downgoing. STUDIES AND REPORTS:  Reviewed above in the HPI. ASSESSMENT AND PLAN:  This is a 68-year-old right-handed female presenting with imbalance and sensory changes describing herself as feeling hot on the left side compared to the right. Found to have a right vertebral thrombus that was partially occlusive and was started on heparin drip by NIS. Today, she is noted to have sensory changes in the left face and right body with right leg weakness, which is new, so repeat CT of the head was performed without hemorrhage. CTA of the head and neck are still pending. MRI of the brain pending. The patient needs a stroke workup including echocardiogram, lipid profile, hemoglobin A1c, and hypercoagulable panel, ordered by NIS. Continue q.1 hour neuro checks. Discussed with nursing staff and NIS NP.       Jayda Taveras MD      [id number]  D:  06/21/2021 13:42  T:  06/21/2021 15:56  JOB #:  [id number]

## 2021-06-21 NOTE — H&P
History and Physical    Subjective:     Gloria Martinez is a 61 y.o. with a pst medical history of hypothyroidism and endometrial cancer who presented to [place] ED for dizziness and L sided weakness. In ED, head CT/CTA obtained which showed a nonocclusive thrombus of the R vertebral artery. Patient transferred to [facility] for further neurologic work up. On arrival, patient stated that she went to [employer] this morning and after she felt dizzy and noted that Jovi Melgar was walking towards the right side. \" She thought that she was experiencing vertigo and that the symptoms would resolved. She then developed decreased sensation of the L side of mouth and felt that her left side \"felt different. \" Patient drove herself to ED. On evaluation, patient alert, oriented, moves all extremities with equal strength, no drift. Patient endorses that L side feeds \"warmer\" than R side. She denies ongoing dizziness, blurred vision, numbness/tingling, nausea, SOB, chest pain, headache, fever, chills.     Past Medical History:   Diagnosis Date    Alopecia     Arthritis     shoulders    Asthma     Cancer ([facility] [address]     ENDOMETRIAL    Edema     Nausea & vomiting     Polyp of colon     Thyroid disease     hypothyroidism      Past Surgical History:   Procedure Laterality Date    ENDOSCOPY, COLON, DIAGNOSTIC  3/2010    ENDOSCOPY, COLON, DIAGNOSTIC      3cm polyp    HX CHOLECYSTECTOMY      HX COLONOSCOPY  6/3/15    X1 polyp    HX GYN          HX GYN      btl    HX GYN  13    hysteroscopy/D&C    HX GYN  10/7/13    TLH/BSO    HX HEENT      tonsillectomy    HX HEENT      NASAL SURGERY     Family History   Problem Relation Age of Onset    Cancer Paternal Grandfather         COLON    Diabetes Father     Heart Disease Father     Hypertension Sister     Diabetes Sister     Lung Disease Sister         Janae Ashcarola    Thyroid Disease Daughter       Social History     Tobacco Use    Smoking status: Never Smoker    Smokeless tobacco: Never Used   Substance Use Topics    Alcohol use: Yes     Comment: RARELY       Prior to Admission medications    Medication Sig Start Date End Date Taking? Authorizing Provider   OTHER     Provider, Historical   Thyroid, Pork, 120 mg tab Take 120 mg by mouth daily. Provider, Historical   cyanocobalamin (VITAMIN B-12) 1,000 mcg tablet Take 1,000 mcg by mouth daily. Provider, Historical   Cholecalciferol, Vitamin D3, 5,000 unit Tab Take  by mouth. Provider, Historical   spironolactone (ALDACTONE) 50 mg tablet Take  by mouth daily. Provider, Historical   Thyroid, Pork, (ARMOUR THYROID) 240 mg Tab Take  by mouth daily. Provider, Historical     Allergies   Allergen Reactions    Demerol [Meperidine] Other (comments)     nightmares    Erythromycin Itching        Review of Systems:  Pertinent items are noted in the History of Present Illness. Objective: Intake and Output:    06/20 1901 - 06/21 0700  In: 45.6 [I.V.:45.6]  Out: 525 [Urine:525]  No intake/output data recorded. Physical Exam:   Visit Vitals  /81 (BP 1 Location: Right arm, BP Patient Position: At rest)   Pulse 62   Temp 97.5 °F (36.4 °C)   Resp 15   Wt 102.2 kg (225 lb 5 oz)   LMP 09/22/2013   SpO2 94%   Breastfeeding No   BMI 35.29 kg/m²     General:  Alert, cooperative, no distress, appears stated age. Head:  Normocephalic, without obvious abnormality, atraumatic. Eyes:  Conjunctivae/corneas clear. PERRL, EOMs intact. Throat: Lips, mucosa, and tongue normal. Teeth and gums normal.   Neck: Supple, symmetrical, trachea midline, no adenopathy   Lungs:   Clear to auscultation bilaterally. Heart:  Regular rate and rhythm, S1, S2 normal, no murmur, click, rub or gallop. Abdomen:   Soft, non-tender. Bowel sounds normal. No masses,  No organomegaly. Extremities: Extremities normal, atraumatic, no cyanosis or edema. Pulses: 2+ and symmetric all extremities.    Skin: Skin color, texture, turgor normal. No rashes or lesions. Lymph nodes: Cervical, supraclavicular, and axillary nodes normal.   Neurologic: CNII-XII intact. Normal strength throughout. Patient states that L side feels \"warmer\" than R side       ECG:  normal EKG, normal sinus rhythm     Data Review:   Recent Results (from the past 24 hour(s))   GLUCOSE, POC    Collection Time: 06/20/21  9:49 PM   Result Value Ref Range    Glucose (POC) 89 65 - 117 mg/dL    Performed by Priya Grijalva    CBC WITH AUTOMATED DIFF    Collection Time: 06/20/21  9:55 PM   Result Value Ref Range    WBC 7.2 3.6 - 11.0 K/uL    RBC 4.74 3.80 - 5.20 M/uL    HGB 14.3 11.5 - 16.0 g/dL    HCT 44.6 35.0 - 47.0 %    MCV 94.1 80.0 - 99.0 FL    MCH 30.2 26.0 - 34.0 PG    MCHC 32.1 30.0 - 36.5 g/dL    RDW 13.1 11.5 - 14.5 %    PLATELET 174 686 - 069 K/uL    MPV 11.0 8.9 - 12.9 FL    NRBC 0.0 0  WBC    ABSOLUTE NRBC 0.00 0.00 - 0.01 K/uL    NEUTROPHILS 54 32 - 75 %    LYMPHOCYTES 31 12 - 49 %    MONOCYTES 12 5 - 13 %    EOSINOPHILS 2 0 - 7 %    BASOPHILS 1 0 - 1 %    IMMATURE GRANULOCYTES 0 0.0 - 0.5 %    ABS. NEUTROPHILS 4.0 1.8 - 8.0 K/UL    ABS. LYMPHOCYTES 2.2 0.8 - 3.5 K/UL    ABS. MONOCYTES 0.9 0.0 - 1.0 K/UL    ABS. EOSINOPHILS 0.1 0.0 - 0.4 K/UL    ABS. BASOPHILS 0.0 0.0 - 0.1 K/UL    ABS. IMM. GRANS. 0.0 0.00 - 0.04 K/UL    DF AUTOMATED     METABOLIC PANEL, COMPREHENSIVE    Collection Time: 06/20/21  9:55 PM   Result Value Ref Range    Sodium 143 136 - 145 mmol/L    Potassium 4.1 3.5 - 5.1 mmol/L    Chloride 104 97 - 108 mmol/L    CO2 26 21 - 32 mmol/L    Anion gap 13 5 - 15 mmol/L    Glucose 84 65 - 100 mg/dL    BUN 16 6 - 20 MG/DL    Creatinine 1.09 (H) 0.55 - 1.02 MG/DL    BUN/Creatinine ratio 15 12 - 20      GFR est AA >60 >60 ml/min/1.73m2    GFR est non-AA 51 (L) >60 ml/min/1.73m2    Calcium 9.0 8.5 - 10.1 MG/DL    Bilirubin, total 0.4 0.2 - 1.0 MG/DL    ALT (SGPT) 34 12 - 78 U/L    AST (SGOT) 19 15 - 37 U/L    Alk.  phosphatase 54 45 - 117 U/L    Protein, total 7.3 6.4 - 8.2 g/dL    Albumin 3.9 3.5 - 5.0 g/dL    Globulin 3.4 2.0 - 4.0 g/dL    A-G Ratio 1.1 1.1 - 2.2     PROTHROMBIN TIME + INR    Collection Time: 06/20/21  9:55 PM   Result Value Ref Range    INR 1.1 0.9 - 1.1      Prothrombin time 10.5 9.0 - 11.1 sec   PTT    Collection Time: 06/20/21  9:55 PM   Result Value Ref Range    aPTT 23.7 22.1 - 31.0 sec    aPTT, therapeutic range     58.0 - 77.0 SECS   MAGNESIUM    Collection Time: 06/20/21  9:55 PM   Result Value Ref Range    Magnesium 1.9 1.6 - 2.4 mg/dL   TSH 3RD GENERATION    Collection Time: 06/20/21  9:55 PM   Result Value Ref Range    TSH 11.76 (H) 0.36 - 3.74 uIU/mL   URINALYSIS W/MICROSCOPIC    Collection Time: 06/21/21 12:00 AM   Result Value Ref Range    Color YELLOW/STRAW      Appearance CLEAR CLEAR      Specific gravity <1.005 1.003 - 1.030    pH (UA) 6.5 5.0 - 8.0      Protein Negative NEG mg/dL    Glucose Negative NEG mg/dL    Ketone Negative NEG mg/dL    Bilirubin Negative NEG      Blood Negative NEG      Urobilinogen 0.2 0.2 - 1.0 EU/dL    Nitrites Negative NEG      Leukocyte Esterase Negative NEG      WBC 0-4 0 - 4 /hpf    RBC 0-5 0 - 5 /hpf    Epithelial cells FEW FEW /lpf    Bacteria Negative NEG /hpf         Assessment:   1. Nonocclusive thrombus of R vertebral artery  2.  Hypothyroidism- TSH 11.76    Plan:   - NIS consulted  - Continue no bolus heparin gtt  - Continue soft cervical collar per NIS recs  - q1h neurocheck  - MRI brain ordered  - Follow up on hypothyroid medication- unclear home dosing    JANET Cassidy

## 2021-06-21 NOTE — PROGRESS NOTES
Imaging reviewed, MRI still pending  Right vertebral artery V2 segment clot on heparin gtt  Exam improving, leg drift resolved, minimal right facial sensory deficit is improved  NIH 1    No EVT indicated at this time. Will follow up when MRI is completed.      Roseanna Wen  [phone]

## 2021-06-21 NOTE — PROGRESS NOTES
Spiritual Care Assessment/Progress Note  [address]      NAME: Ailyn Petersen      MRN: [id number]  AGE: 61 y.o. SEX: female  Restorationism Affiliation: Yazdanism   Language: English     6/21/2021     Total Time (in minutes): 24     Spiritual Assessment begun in [address] through conversation with:         []Patient        [] Family    [] Friend(s)        Reason for Consult: Other (comment) (Code Stroke)     Spiritual beliefs: (Please include comment if needed)     [] Identifies with a pieter tradition:         [] Supported by a pieter community:            [] Claims no spiritual orientation:           [] Seeking spiritual identity:                [] Adheres to an individual form of spirituality:           [x] Not able to assess:                           Identified resources for coping:      [] Prayer                               [] Music                  [] Guided Imagery     [] Family/friends                 [] Pet visits     [] Devotional reading                         [x] Unknown     [] Other:                                   Interventions offered during this visit: (See comments for more details)                Plan of Care:     [] Support spiritual and/or cultural needs    [] Support AMD and/or advance care planning process      [] Support grieving process   [] Coordinate Rites and/or Rituals    [] Coordination with community clergy   [] No spiritual needs identified at this time   [] Detailed Plan of Care below (See Comments)  [] Make referral to Music Therapy  [] Make referral to Pet Therapy     [] Make referral to Addiction services  [] Make referral to [facility]  [] Make referral to Spiritual Care Partner  [] No future visits requested        [x] Follow up upon further referrals     Comments:  responded to Code Stroke ICU room 7114. Staff with patient providing care. No family or visitors present. Patient taken for CT scan.   Please contact spiritual care for further referral or consult. Rev.  Klaudia Saha MDiv, [facility], [address] [facility] paging service: [phone])

## 2021-06-21 NOTE — PROGRESS NOTES
0730: Bedside and Verbal shift change report given to [address] (oncoming nurse) by Lopez Castellon RN (offgoing nurse). Report included the following information SBAR, Kardex, ED Summary, Intake/Output, Recent Results, Cardiac Rhythm NSR and Dual Neuro Assessment. 1115: Code Stroke called on pt by Nabor NP during exam for R leg drift, sensory changes on R upper cheek, and numbness to upper lip. Dr. Bc Galeano examined pt at bedside. [id number]: Pt traveled without incident to CT/CTA with this RN and PCT. 1500: Pt reports numbness to upper lip has resolved. [id number]: Pt traveled without incident to MRI with this RN and transport. Pt reports no longer feeling like she is drifting to the R when walking. This RN witnessed pt walking straight and steady. 1930: Bedside and Verbal shift change report given to [id number] (oncoming nurse) by Rudolph Mitchell RN (offgoing nurse). Report included the following information SBAR.

## 2021-06-21 NOTE — ED NOTES
PATIENT TRANSPORTED TO [facility] WITH [facility] TRANSPORT SERVICE.

## 2021-06-21 NOTE — PROGRESS NOTES
Problem: Self Care Deficits Care Plan (Adult)  Goal: *Acute Goals and Plan of Care (Insert Text)  Description: FUNCTIONAL STATUS PRIOR TO ADMISSION: Patient was independent and active without use of DME.     HOME SUPPORT: The patient lived alone with daughter in area to provide assistance. Occupational Therapy Goals  Initiated 6/21/2021  1. Patient will perform ADLs standing 5 mins without fatigue or LOB IND within 7 day(s). 2.  Patient will perform lower body ADLs IND within 7  day(s). 3.  Patient will perform gathering ADL items high and low 2/2 IND within 7 day(s). 4.  Patient will perform toilet transfers IND within 7 day(s). 5.  Patient will perform all aspects of toileting IND within 7 day(s). 6.  Patient will participate in cardiac/sternal upper extremity therapeutic exercise/activities to increase independence with ADLs IND within 7 day(s). Outcome: Progressing Towards Goal    OCCUPATIONAL THERAPY EVALUATION  Patient: Dexter Mortimer (10 y.o. 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 Patient ADLs limited by impaired balance and mild R inattention. Patient reporting impaired sensation in R cheek and across top of mouth. Patient IND with ADLs and lives alone at baseline. Today, patient received in bed and agreeable to therapy. Patient with up to [address] for bed mobility and functional mobility. Patient with CGA to elliot socks in tailor sitting on EOB. Patient with CGA for functional mobility on unit and in room. Patient with up to [address] for toileting and to stand at sink for grooming. Patient returned to chair at end of session and left with call bell in reach, RN aware.  Patient my benefit from 1-2 more sessions for safety in bathroom and during IADLs - anticipate no f/u needs at D/C. Patient VSS throughout session. Will continue to follow. Current Level of Function Impacting Discharge (ADLs/self-care): up to CGA    Functional Outcome Measure: The patient scored Total A-D  Total A-D (Motor Function): 66/66 on the Fugl-Staley Assessment which is indicative of no impairment in upper extremity functional status. Other factors to consider for discharge: was IND at baseline, has good support     Patient will benefit from skilled therapy intervention to address the above noted impairments. PLAN :  Recommendations and Planned Interventions: self care training, functional mobility training, therapeutic exercise, balance training, therapeutic activities, endurance activities, patient education, home safety training, and family training/education    Frequency/Duration: Patient will be followed by occupational therapy 5 times a week to address goals. Recommendation for discharge: (in order for the patient to meet his/her long term goals)  No skilled occupational therapy/ follow up rehabilitation needs identified at this time. This discharge recommendation:  Has been made in collaboration with the attending provider and/or case management    IF patient discharges home will need the following DME: none       SUBJECTIVE:   Patient stated I feel pretty good.     OBJECTIVE DATA SUMMARY:   HISTORY:   Past Medical History:   Diagnosis Date    Alopecia     Arthritis     shoulders    Asthma     Cancer ([facility] [address]     ENDOMETRIAL    Edema     Nausea & vomiting     Polyp of colon     Thyroid disease     hypothyroidism     Past Surgical History:   Procedure Laterality Date    ENDOSCOPY, COLON, DIAGNOSTIC  3/2010    ENDOSCOPY, COLON, DIAGNOSTIC      3cm polyp    HX CHOLECYSTECTOMY      HX COLONOSCOPY  6/3/15    X1 polyp    HX GYN          HX GYN      btl    HX GYN  13    hysteroscopy/D&C    HX GYN  10/7/13    TLH/BSO    HX HEENT      tonsillectomy    HX HEENT NASAL SURGERY     Expanded or extensive additional review of patient history:     Home Situation  Home Environment: Trailer/mobile home  # Steps to Enter: 3  One/Two Story Residence: One story  Living Alone: Yes  Support Systems: Child(leola)  Current DME Used/Available at Home: None  Tub or Shower Type: Tub/Shower combination    Hand dominance: Right    EXAMINATION OF PERFORMANCE DEFICITS:  Cognitive/Behavioral Status:  Neurologic State: Alert  Orientation Level: Oriented X4  Cognition: Appropriate for age attention/concentration; Follows commands  Perception: Cues to attend to right side of body;Verbal  Perseveration: No perseveration noted  Safety/Judgement: Awareness of environment;Decreased insight into deficits; Fall prevention    Skin: appears intact    Edema: none noted in BUEs    Hearing: Auditory  Auditory Impairment: None    Vision/Perceptual:    Tracking: Able to track stimulus in all quadrants w/o difficulty    Diplopia: No    Acuity: Within Defined Limits    Corrective Lenses: Contacts    Range of Motion:  In BUEs  AROM: Within functional limits  PROM: Within functional limits    Strength: In BUEs  Strength: Within functional limits    Coordination:  Coordination: Within functional limits  Fine Motor Skills-Upper: Left Intact; Right Intact    Gross Motor Skills-Upper: Left Intact; Right Intact    Tone & Sensation:  In BUEs  Tone: Normal  Sensation: Intact (impaired sensation in face)    Balance:  Sitting: Impaired  Sitting - Static: Good (unsupported)  Sitting - Dynamic: Fair (occasional)  Standing: Impaired; Without support  Standing - Static: Good  Standing - Dynamic : Fair    Functional Mobility and Transfers for ADLs:  Bed Mobility:  Rolling: Contact guard assistance  Supine to Sit: Contact guard assistance  Scooting: Contact guard assistance    Transfers:  Sit to Stand: Contact guard assistance  Stand to Sit: Contact guard assistance  Bed to Chair: Contact guard assistance  Toilet Transfer : Contact guard assistance    ADL Assessment:  Feeding: Independent    Oral Facial Hygiene/Grooming: Stand-by assistance    Bathing: Stand-by assistance    Upper Body Dressing: Stand-by assistance    Lower Body Dressing: Stand-by assistance    Toileting: Stand by assistance    ADL Intervention and task modifications:     Grooming  Position Performed: Standing  Washing Face: Stand-by assistance  Brushing Teeth: Stand-by assistance    Lower Body Dressing Assistance  Socks: Stand-by assistance  Leg Crossed Method Used: Yes  Position Performed: Seated edge of bed  Cues: Don;Verbal cues provided    Toileting  Bladder Hygiene: Supervision  Bowel Hygiene: Supervision  Clothing Management: Stand-by assistance    Cognitive Retraining  Safety/Judgement: Awareness of environment;Decreased insight into deficits; Fall prevention     Functional Measure:  Fugl-Staley Assessment of Motor Recovery after Stroke:   Reflex Activity  Flexors/Biceps/Fingers: Can be elicited  Extensors/Triceps: Can be elicited  Reflex Subtotal: 4    Volitional Movement Within Synergies  Shoulder Retraction: Full  Shoulder Elevation: Full  Shoulder Abduction (90 degrees): Full  Shoulder External Rotation: Full  Elbow Flexion: Full  Forearm Supination: Full  Shoulder Adduction/Internal Rotation: Full  Elbow Extension: Full  Forearm Pronation: Full  Subtotal: 18    Volitional Movement Mixing Synergies  Hand to Lumbar Spine: Full  Shoulder Flexion (0-90 degrees): Full  Pronation-Supination: Full  Subtotal: 6    Volitional Movement With Little or No Synergy  Shoulder Abduction (0-90 degrees): Full  Shoulder Flexion ( degrees): Full  Pronation/Supination: Full  Subtotal : 6    Normal Reflex Activity  Biceps, Triceps, Finger Flexors:  Full  Subtotal : 2    Upper Extremity Total   Upper Extremity Total: 36    Wrist  Stability at 15 Degree Dorsiflexion: Full  Repeated Dorsiflexion/ Volar Flexion: Full  Stability at 15 Degree Dorsiflexion: Full  Repeated Dorsiflexion/ Volar Flexion: Full  Circumduction: Full  Wrist Total: 10    Hand  Mass Flexion: Full  Mass Extension: Full  Grasp A: Full  Grasp B: Full  Grasp C: Full  Grasp D: Full  Grasp E: Full  Hand Total: 14    Coordination/Speed  Tremor: None  Dysmetria: None  Time: <1s  Coordination/Speed Total : 6    Total A-D  Total A-D (Motor Function): 66/66     This is a reliable/valid measure of arm function after a neurological event. It has established value to characterize functional status and for measuring spontaneous and therapy-induced recovery; tests proximal and distal motor functions. Fugl-Staley Assessment - UE scores recorded between five and 30 days post neurologic event can be used to predict UE recovery at six months post neurologic event. Severe = 0-21 points   Moderately Severe = 22-33 points   Moderate = 34-47 points   Mild = 48-66 points  MARLEN Linn, ANIVAL Kim, & MATTHEW Hopper (1992). Measurement of motor recovery after stroke: Outcome assessment and sample size requirements.  Stroke, 23, pp. 2225-4022.   ------------------------------------------------------------------------------------------------------------------------------------------------------------------  MCID:  Stroke:   Estela Aden et al, 2001; n = 171; mean age 79 (5) years; assessed within 16 (12) days of stroke, Acute Stroke)  FMA Motor Scores from Admission to Discharge   10 point increase in FMA Upper Extremity = 1.5 change in discharge FIM   10 point increase in FMA Lower Extremity = 1.9 change in discharge FIM  MDC:   Stroke:   Ericka Roman et al, 2008, n = 14, mean age = 59.9 (14.6) years, assessed on average 14 (6.5) months post stroke, Chronic Stroke)   FMA = 5.2 points for the Upper Extremity portion of the assessment     Occupational Therapy Evaluation Charge Determination   History Examination Decision-Making   LOW Complexity : Brief history review  LOW Complexity : 1-3 performance deficits relating to physical, cognitive , or psychosocial skils that result in activity limitations and / or participation restrictions  LOW Complexity : No comorbidities that affect functional and no verbal or physical assistance needed to complete eval tasks       Based on the above components, the patient evaluation is determined to be of the following complexity level: LOW   Pain Rating:  Reporting no pain    Activity Tolerance:   Good    After treatment patient left in no apparent distress:    Sitting in chair, Call bell within reach, Bed / chair alarm activated, and RN aware    COMMUNICATION/EDUCATION:   The patients plan of care was discussed with: Physical therapist and Registered nurse. Patient was educated regarding her deficit(s) of impaired balance as this relates to her diagnosis of R nonocclusive thrombus of vertebral artery. She demonstrated Good understanding as evidenced by verbal responses. Home safety education was provided and the patient/caregiver indicated understanding. and Patient/family have participated as able in goal setting and plan of care. This patients plan of care is appropriate for delegation to [facility].     Thank you for this referral.  Julio C Blankenship OT  Time Calculation: 36 mins

## 2021-06-21 NOTE — PROGRESS NOTES
Problem: Mobility Impaired (Adult and Pediatric)  Goal: *Acute Goals and Plan of Care (Insert Text)  Description: FUNCTIONAL STATUS PRIOR TO ADMISSION: Patient was independent and active without use of DME.    HOME SUPPORT PRIOR TO ADMISSION: The patient lived alone with children nearby to assist.    Physical Therapy Goals  Initiated 6/21/2021  1. Patient will move from supine to sit and sit to supine , scoot up and down, and roll side to side in bed with independence within 7 day(s). 2.  Patient will transfer from bed to chair and chair to bed with independence using the least restrictive device within 7 day(s). 3.  Patient will perform sit to stand with independence within 7 day(s). 4.  Patient will ambulate with supervision for 150 feet with the least restrictive device within 7 day(s). 5.  Patient will ascend/descend 3 stairs with 1 handrail(s) with contact guard assist within 7 day(s). Outcome: Not Met     PHYSICAL THERAPY EVALUATION  Patient: Sean Hill (89 y.o. female)  Date: 6/21/2021  Primary Diagnosis: Vertebral artery occlusion, right [I65.01]        Precautions: Fall risk, neck brace    ASSESSMENT  Based on the objective data described below, the patient presents with impaired sitting and standing balance, decreased activity tolerance, decreased gait speed and path deviations, and overall decline in functional mobility s/p admission with R vertebral artery occlusion. At baseline, patient is independent and lives alone. This date, she was received in supine with a soft cervical collar to prevent bending or twisting of neck. She transferred supine>sit with a log roll and was educated on continuing to use this technique to prevent twisting of neck. Strength, sensation, and coordination intact on BLE. Of note, patient c/o R mouth numbness. She also has baseline L hip pain (1-2/10) during ambulation. Ambulated 100ft in li with CGA and verbal cues to correct right path deviations.  Patient states she can see obstacles on her right side but was unable to avoid them on several occasions. She ambulates slowly with absent arm swing, and states her gait is below baseline. Patient transferred on<>off commode and performed pericare with CGA. VSS during all transfers and ambulation. Ended session in chair with all needs met. Recommending HH PT for discharge. Current Level of Function Impacting Discharge (mobility/balance): CGA for all transfers and mobility    Functional Outcome Measure: The patient scored 70/100 on the Barthel outcome measure which is indicative of 30% impairment or dependence on others for functional mobility and ADLs. Other factors to consider for discharge: Independent baseline, lives alone, fall risk     Patient will benefit from skilled therapy intervention to address the above noted impairments. PLAN :  Recommendations and Planned Interventions: bed mobility training, transfer training, gait training, therapeutic exercises, neuromuscular re-education, patient and family training/education, and therapeutic activities      Frequency/Duration: Patient will be followed by physical therapy:  5 times a week to address goals. Recommendation for discharge: (in order for the patient to meet his/her long term goals)  Physical therapy at least 2 days/week in the home and supervision of 1 person for ambulation. This discharge recommendation:  Has not yet been discussed the attending provider and/or case management    IF patient discharges home will need the following DME: none         SUBJECTIVE:   Patient stated this is slower than how I normally walk.     OBJECTIVE DATA SUMMARY:   HISTORY:    Past Medical History:   Diagnosis Date    Alopecia     Arthritis     shoulders    Asthma     Cancer ([facility] [address] 2013    ENDOMETRIAL    Edema     Nausea & vomiting     Polyp of colon     Thyroid disease     hypothyroidism     Past Surgical History:   Procedure Laterality Date    ENDOSCOPY, COLON, DIAGNOSTIC  3/2010    ENDOSCOPY, COLON, DIAGNOSTIC      3cm polyp    HX CHOLECYSTECTOMY      HX COLONOSCOPY  6/3/15    X1 polyp    HX GYN  1988        HX GYN      btl    HX GYN  13    hysteroscopy/D&C    HX GYN  10/7/13    TLH/BSO    HX HEENT      tonsillectomy    HX HEENT      NASAL SURGERY       Home Situation  Home Environment: Trailer/mobile home  # Steps to Enter: 3  One/Two Story Residence: One story  Living Alone: Yes  Support Systems: Child(leola)  Current DME Used/Available at Home: None  Tub or Shower Type: Tub/Shower combination    EXAMINATION/PRESENTATION/DECISION MAKING:   Critical Behavior:  Neurologic State: Alert  Orientation Level: Oriented X4  Cognition: Appropriate for age attention/concentration, Follows commands  Safety/Judgement: Awareness of environment, Decreased insight into deficits, Fall prevention  Hearing: Auditory  Auditory Impairment: None  Range Of Motion:  AROM: Within functional limits  PROM: Within functional limits     Strength:    Strength: Within functional limits     Tone & Sensation:   Tone: Normal  Sensation: Intact (impaired sensation in face)     Coordination:  Coordination: Within functional limits    Functional Mobility:  Bed Mobility:  Rolling: Contact guard assistance  Supine to Sit: Contact guard assistance     Scooting: Contact guard assistance  Transfers:  Sit to Stand: Contact guard assistance  Stand to Sit: Contact guard assistance        Bed to Chair: Contact guard assistance              Balance:   Sitting: Impaired  Sitting - Static: Good (unsupported)  Sitting - Dynamic: Fair (occasional)  Standing: Impaired; Without support  Standing - Static: Good  Standing - Dynamic : Fair  Ambulation/Gait Training:  Distance (ft): 100 Feet (ft)  Assistive Device: Gait belt  Ambulation - Level of Assistance: Contact guard assistance  Gait Abnormalities: Altered arm swing;Decreased step clearance; Path deviations; Shuffling gait  Step Length: Left shortened;Right shortened    Functional Measure:  Barthel Index:    Bathin  Bladder: 10  Bowels: 10  Groomin  Dressin  Feedin  Mobility: 10  Stairs: 5  Toilet Use: 5  Transfer (Bed to Chair and Back): 10  Total: 60/100       The Barthel ADL Index: Guidelines  1. The index should be used as a record of what a patient does, not as a record of what a patient could do. 2. The main aim is to establish degree of independence from any help, physical or verbal, however minor and for whatever reason. 3. The need for supervision renders the patient not independent. 4. A patient's performance should be established using the best available evidence. Asking the patient, friends/relatives and nurses are the usual sources, but direct observation and common sense are also important. However direct testing is not needed. 5. Usually the patient's performance over the preceding 24-48 hours is important, but occasionally longer periods will be relevant. 6. Middle categories imply that the patient supplies over 50 per cent of the effort. 7. Use of aids to be independent is allowed. Pleasant Harm., Barthel, D.W. (0054). Functional evaluation: the Barthel Index. [address] [facility] (14)2. Jing Ceballos theodora SOLEDAD Storey, Laura Dueñas., Natasha Erickson., [place], [address] [place] [address] (). Measuring the change indisability after inpatient rehabilitation; comparison of the responsiveness of the Barthel Index and Functional Ness Measure. Journal of Neurology, Neurosurgery, and Psychiatry, 66(4), 984-805. LAURA Serrano.LINA, SAI Garsia, & Gavin Bartlett MTONYA. (2004.) Assessment of post-stroke quality of life in cost-effectiveness studies: The usefulness of the Barthel Index and the EuroQoL-5D.  Quality of Life Research, 15, 077-32          Physical Therapy Evaluation Charge Determination   History Examination Presentation Decision-Making   HIGH Complexity :3+ comorbidities / personal factors will impact the outcome/ POC  HIGH Complexity : 4+ Standardized tests and measures addressing body structure, function, activity limitation and / or participation in recreation  LOW Complexity : Stable, uncomplicated  Other outcome measures Barthel  MEDIUM      Based on the above components, the patient evaluation is determined to be of the following complexity level: LOW     Pain Ratin-2/10 in L hip during ambulation (baseline)    Activity Tolerance:   Good    After treatment patient left in no apparent distress:   Sitting in chair and Call bell within reach    COMMUNICATION/EDUCATION:   The patients plan of care was discussed with: Occupational therapist and Registered nurse. Fall prevention education was provided and the patient/caregiver indicated understanding., Patient/family have participated as able in goal setting and plan of care. , and Patient/family agree to work toward stated goals and plan of care. Regarding student involvement in patient care:  A student participated in this treatment session. Per CMS Medicare statements and APTA guidelines I certify that the following was true:  1. I was present and directly observed the entire session. 2. I made all skilled judgments and clinical decisions regarding care. 3. I am the practitioner responsible for assessment, treatment, and documentation.     Thank you for this referral.  Erick Joya, SPT   Time Calculation: 29 mins

## 2021-06-22 ENCOUNTER — APPOINTMENT (OUTPATIENT)
Dept: CT IMAGING | Age: 66
DRG: 064 | End: 2021-06-22
Attending: NURSE PRACTITIONER
Payer: COMMERCIAL

## 2021-06-22 LAB
ANION GAP SERPL CALC-SCNC: 7 MMOL/L (ref 5–15)
APTT PPP: 62.8 SEC (ref 22.1–31)
APTT PPP: 93.7 SEC (ref 22.1–31)
B2 GLYCOPROT1 IGA SER-ACNC: <9 GPI IGA UNITS (ref 0–25)
B2 GLYCOPROT1 IGG SER-ACNC: <9 GPI IGG UNITS (ref 0–20)
B2 GLYCOPROT1 IGM SER-ACNC: <9 GPI IGM UNITS (ref 0–32)
BASOPHILS # BLD: 0.1 K/UL (ref 0–0.1)
BASOPHILS NFR BLD: 1 % (ref 0–1)
BUN SERPL-MCNC: 12 MG/DL (ref 6–20)
BUN/CREAT SERPL: 16 (ref 12–20)
CALCIUM SERPL-MCNC: 9.2 MG/DL (ref 8.5–10.1)
CARDIOLIPIN IGA SER IA-ACNC: <9 APL U/ML (ref 0–11)
CARDIOLIPIN IGG SER IA-ACNC: <9 GPL U/ML (ref 0–14)
CARDIOLIPIN IGM SER IA-ACNC: <9 MPL U/ML (ref 0–12)
CHLORIDE SERPL-SCNC: 109 MMOL/L (ref 97–108)
CO2 SERPL-SCNC: 25 MMOL/L (ref 21–32)
COMMENT, HOLDF: NORMAL
CREAT SERPL-MCNC: 0.76 MG/DL (ref 0.55–1.02)
DIFFERENTIAL METHOD BLD: NORMAL
EOSINOPHIL # BLD: 0.2 K/UL (ref 0–0.4)
EOSINOPHIL NFR BLD: 3 % (ref 0–7)
ERYTHROCYTE [DISTWIDTH] IN BLOOD BY AUTOMATED COUNT: 13.1 % (ref 11.5–14.5)
GLUCOSE SERPL-MCNC: 96 MG/DL (ref 65–100)
HCT VFR BLD AUTO: 42.7 % (ref 35–47)
HGB BLD-MCNC: 13.7 G/DL (ref 11.5–16)
IMM GRANULOCYTES # BLD AUTO: 0 K/UL (ref 0–0.04)
IMM GRANULOCYTES NFR BLD AUTO: 0 % (ref 0–0.5)
LYMPHOCYTES # BLD: 2.2 K/UL (ref 0.8–3.5)
LYMPHOCYTES NFR BLD: 35 % (ref 12–49)
MAGNESIUM SERPL-MCNC: 2 MG/DL (ref 1.6–2.4)
MCH RBC QN AUTO: 30 PG (ref 26–34)
MCHC RBC AUTO-ENTMCNC: 32.1 G/DL (ref 30–36.5)
MCV RBC AUTO: 93.6 FL (ref 80–99)
MONOCYTES # BLD: 0.7 K/UL (ref 0–1)
MONOCYTES NFR BLD: 11 % (ref 5–13)
NEUTS SEG # BLD: 3.1 K/UL (ref 1.8–8)
NEUTS SEG NFR BLD: 50 % (ref 32–75)
NRBC # BLD: 0 K/UL (ref 0–0.01)
NRBC BLD-RTO: 0 PER 100 WBC
PHOSPHATE SERPL-MCNC: 3.4 MG/DL (ref 2.6–4.7)
PLATELET # BLD AUTO: 213 K/UL (ref 150–400)
PMV BLD AUTO: 11.2 FL (ref 8.9–12.9)
POTASSIUM SERPL-SCNC: 4.2 MMOL/L (ref 3.5–5.1)
RBC # BLD AUTO: 4.56 M/UL (ref 3.8–5.2)
SAMPLES BEING HELD,HOLD: NORMAL
SODIUM SERPL-SCNC: 141 MMOL/L (ref 136–145)
THERAPEUTIC RANGE,PTTT: ABNORMAL SECS (ref 58–77)
THERAPEUTIC RANGE,PTTT: ABNORMAL SECS (ref 58–77)
WBC # BLD AUTO: 6.1 K/UL (ref 3.6–11)

## 2021-06-22 PROCEDURE — 97535 SELF CARE MNGMENT TRAINING: CPT

## 2021-06-22 PROCEDURE — 74011250637 HC RX REV CODE- 250/637: Performed by: NURSE PRACTITIONER

## 2021-06-22 PROCEDURE — APPNB45 APP NON BILLABLE 31-45 MINUTES: Performed by: NURSE PRACTITIONER

## 2021-06-22 PROCEDURE — 85730 THROMBOPLASTIN TIME PARTIAL: CPT

## 2021-06-22 PROCEDURE — 74011250636 HC RX REV CODE- 250/636: Performed by: NURSE PRACTITIONER

## 2021-06-22 PROCEDURE — 99231 SBSQ HOSP IP/OBS SF/LOW 25: CPT | Performed by: RADIOLOGY

## 2021-06-22 PROCEDURE — 74011250637 HC RX REV CODE- 250/637: Performed by: STUDENT IN AN ORGANIZED HEALTH CARE EDUCATION/TRAINING PROGRAM

## 2021-06-22 PROCEDURE — 80048 BASIC METABOLIC PNL TOTAL CA: CPT

## 2021-06-22 PROCEDURE — 84100 ASSAY OF PHOSPHORUS: CPT

## 2021-06-22 PROCEDURE — 36415 COLL VENOUS BLD VENIPUNCTURE: CPT

## 2021-06-22 PROCEDURE — 85025 COMPLETE CBC W/AUTO DIFF WBC: CPT

## 2021-06-22 PROCEDURE — 70450 CT HEAD/BRAIN W/O DYE: CPT

## 2021-06-22 PROCEDURE — 99233 SBSQ HOSP IP/OBS HIGH 50: CPT | Performed by: PSYCHIATRY & NEUROLOGY

## 2021-06-22 PROCEDURE — 65660000000 HC RM CCU STEPDOWN

## 2021-06-22 PROCEDURE — 83735 ASSAY OF MAGNESIUM: CPT

## 2021-06-22 RX ORDER — ENOXAPARIN SODIUM 100 MG/ML
1 INJECTION SUBCUTANEOUS EVERY 12 HOURS
Status: DISCONTINUED | OUTPATIENT
Start: 2021-06-22 | End: 2021-06-24

## 2021-06-22 RX ADMIN — Medication 10 ML: at 17:51

## 2021-06-22 RX ADMIN — SODIUM CHLORIDE, POTASSIUM CHLORIDE, SODIUM LACTATE AND CALCIUM CHLORIDE 75 ML/HR: 600; 310; 30; 20 INJECTION, SOLUTION INTRAVENOUS at 02:12

## 2021-06-22 RX ADMIN — HEPARIN SODIUM 14 UNITS/KG/HR: 10000 INJECTION, SOLUTION INTRAVENOUS at 04:36

## 2021-06-22 RX ADMIN — Medication 5 ML: at 00:20

## 2021-06-22 RX ADMIN — ATORVASTATIN CALCIUM 40 MG: 40 TABLET, FILM COATED ORAL at 21:13

## 2021-06-22 RX ADMIN — SODIUM CHLORIDE, POTASSIUM CHLORIDE, SODIUM LACTATE AND CALCIUM CHLORIDE 75 ML/HR: 600; 310; 30; 20 INJECTION, SOLUTION INTRAVENOUS at 18:05

## 2021-06-22 RX ADMIN — HEPARIN SODIUM 14 UNITS/KG/HR: 10000 INJECTION, SOLUTION INTRAVENOUS at 13:05

## 2021-06-22 RX ADMIN — Medication 10 ML: at 21:13

## 2021-06-22 RX ADMIN — Medication 10 ML: at 06:39

## 2021-06-22 RX ADMIN — ENOXAPARIN SODIUM 100 MG: 100 INJECTION SUBCUTANEOUS at 17:44

## 2021-06-22 RX ADMIN — ACETAMINOPHEN 650 MG: 325 TABLET ORAL at 13:07

## 2021-06-22 RX ADMIN — LEVOTHYROXINE, LIOTHYRONINE 90 MG: 38; 9 TABLET ORAL at 10:02

## 2021-06-22 RX ADMIN — Medication 10 ML: at 14:20

## 2021-06-22 NOTE — PROGRESS NOTES
1945: Bedside and Verbal shift change report given to Oracio Ignacio (oncoming nurse) by Ted Larson (offgoing nurse). Report included the following information SBAR, Kardex, Intake/Output, MAR, Recent Results, Cardiac Rhythm SR and Dual Neuro Assessment. Problem: Breathing Pattern - Ineffective  Goal: *Use of effective breathing techniques  Outcome: Progressing Towards Goal  Goal: *PALLIATIVE CARE:  Alleviation of Dyspnea  Outcome: Progressing Towards Goal     Problem: Patient Education: Go to Patient Education Activity  Goal: Patient/Family Education  Outcome: Progressing Towards Goal     Problem: Patient Education: Go to Patient Education Activity  Goal: Patient/Family Education  Outcome: Progressing Towards Goal     Problem: Patient Education: Go to Patient Education Activity  Goal: Patient/Family Education  Outcome: Progressing Towards Goal     Problem: Falls - Risk of  Goal: *Absence of Falls  Description: Document Min Showman Fall Risk and appropriate interventions in the flowsheet.   Outcome: Progressing Towards Goal  Note: Fall Risk Interventions:            Medication Interventions: Patient to call before getting OOB

## 2021-06-22 NOTE — PROGRESS NOTES
Transferred from ICU, per nurse started having right sided numbness, has intermittently. PTT came back at 93.7, heparin gtt on hold for 1 hour. Pt called this RN into room, reporting increased numbness on right side of face and increased pressure behind right eye. Per report code S was called 6/21 due to right leg drift and right side numbness. Gretel Mc, NP contacted, has been a intermittent problem with pt. CT of head ordered. CT stable. Bedside and Verbal shift change report given to Melanie/SHAN Eckert's (oncoming nurse) by Yovany Petersen RN (offgoing nurse). Report included the following information SBAR, Kardex, Intake/Output, MAR, Cardiac Rhythm NSR and Quality Measures.

## 2021-06-22 NOTE — PROGRESS NOTES
[facility]     ICU Transfer/Accept Summary     [place] [address] [place]  DATE OF TRANSFER: 6/22/2021       PATIENT ID: Jacqui Evans  MRN: [id number]   YOB: 1955    PRIMARY CARE PROVIDER: Richad Leyden, MD   DATE OF ADMISSION: 6/20/2021  9:44 PM    ATTENDING PHYSICIAN: Grace Rodriguez NP  CONSULTATIONS:   IP CONSULT TO NEUROLOGY  IP CONSULT TO NEUROINTERVENTIONAL SURGERY  IP CONSULT TO INTENSIVIST    PROCEDURES/SURGERIES:   * No surgery found *    REASON FOR ADMISSION: <principal problem not specified>     HOSPITAL PROBLEM LIST:  Patient Active Problem List   Diagnosis Code    [facility] C54.[facility]    Vertebral artery occlusion, right I65.01         Brief HPI and Hospital Course:      Jacqui Evans is a 82-year-old female with a PMH only of hypothyroidism, arthritis and endometrial cancer who was transferred to [facility] from the ED at [facility] for treatment of a nonocclusive thrombus of the right vertebral artery. She originally presented to the ED complaining of dizziness and feeling like she was leaning towards her right. Later on, she felt numbness on the left side of her mouth and noted that the left side of her body \"felt different and warmer\" than the right side of her body. At no time did she have any diplopia, syncope, speech difficulties or focal weakness. She denies any recent falls, trauma, injuries or MVAs. CT the head showed no acute abnormality, CTA of the head neck showed nonocclusive thrombus in the right vertebral artery with an osteophyte in close proximity compressing the right vertebral artery with a clot originates. Late in the morning of 6/21, a code stroke was called when the neuro NP noted a right leg drift and sensory changes on the right upper cheek compared to the left.   Her note states that patient complained of numbness to her upper lip and decreased sensation to pinprick on the right arm when compared to the left which was new. On further examination with the neurologist however, patient reported decreased sensation on her left arm when compared to her right! Repeat imaging showed only an unchanged nonocclusive intraluminal filling defect in the V2 segment of the right vertebral artery. MRI showed few punctate foci of possible acute infarction in the right cerebellum, midbrain and cesar     On examination, patient denies weakness, paresthesias and warmth anywhere on her body. She has no dizziness, diplopia or vertigo. She states that she sometimes feels the warmth on the left side of her body increase when she sits up, but while she has been lying in bed, she has felt \"just fine\". She denies chest pain, shortness of breath, cough, abdominal pain, nausea, vomiting and diarrhea. Lab work was notable only for an elevated TSH (11.7) patient has a history of hypothyroidism and takes medication. Assessment and Plan:    Nonocclusive thrombus of the right vertebral artery  Possible acute infarct right cerebellum  · MRI brain findings as above  · NIS consulted  · Heparin drip x3 days (end 1:59 PM 6/23). PTT goal 6080  · Soft cervical collar per NIS recs  · Every 2 hours neurochecks  · Continue atorvastatin  · Repeat CTA on 6/23  · PT and OT already on board  · Neurology following  · vascular surgery following    History hypothyroid  · Continue [place] Thyroid 90 mg daily      Diet: Regular  Activity: OOB with assist  DVT: Heparin drip, SCDs                   PHYSICAL EXAMINATION:  Visit Vitals  /89   Pulse 61   Temp 97.7 °F (36.5 °C)   Resp 15   Ht 5' 7\" (1.702 m)   Wt 102.1 kg (225 lb)   LMP 09/22/2013   SpO2 95%   Breastfeeding No   BMI 35.24 kg/m²       General:          Alert, cooperative, no distress  HEENT:           Atraumatic, MMM            Neck:               Supple, symmetrical,  thyroid: non tender.   Soft neck collar in place  Lungs:             Clear to auscultation bilaterally. No Wheezing or Rhonchi. No rales. Heart:              Regular  rhythm,  No  murmur   No edema  Abdomen:        Softly distended. Not distended. Bowel sounds normal  Extremities:     No cyanosis. No clubbing,  +2 distal pulses  Skin:                Not pale. Not Jaundiced  No rashes   Psych:             Not anxious or agitated. Neurologic:      Alert, moves all extremities, oriented X 3. There is no focal weakness.   Equal sensation bilaterally    CODE STATUS:  x Full Code    DNR    Partial    Comfort Care     Signed:   Shana Gentile NP  Date of Service:  6/22/2021  12:18 AM

## 2021-06-22 NOTE — PROGRESS NOTES
Bedside and Verbal shift change report given to Delores Pollock (oncoming nurse) by Emilee Larson (offgoing nurse). Report included the following information SBAR, Kardex, Intake/Output, MAR, Recent Results, Cardiac Rhythm SR and Dual Neuro Assessment.

## 2021-06-22 NOTE — PROGRESS NOTES
2000: Dual neuro. Patient A&O x4. ZAMUDIO. No drift. Equal and intact. Q 1 hour checks. 2200: Neuro NP at bedside for neuro assessment. 0230: Report given to Alexa Nguyen RN.     3040: Patient states the numbness on the right side of her lip and face has come back. Neuro NP at bedside. No new orders received.

## 2021-06-22 NOTE — PROGRESS NOTES
Transition of Care Plan  RUR 11%    Disposition  home  Transportation Family/  Home health TBD will arrange if ordered  Medical follow up  PCP and specialist    [address]  X   [phone]  Jodie Raya  Daughter [phone]    Reason for Admission: Dizziness and sensory changes to the left side of her body--Right sided facial numbness and right upper lip numbness. Vertebral artery occulusion  Hx asthma endometrial cancer laparoscopic hysterectomy                    RUR Score:  11%                    Plan for utilizing home health:  TBD        PCP: First and Last name:  Richad Leyden, MD     Name of Practice:    Are you a current patient: Yes/No: yes   Approximate date of last visit: 4 months ago  Appointment in July 2021   Can you participate in a virtual visit with your PCP: no                    Current Advanced Directive/Advance Care Plan: Full Code      Healthcare Decision Maker:   Click here to complete [address] including selection of the Healthcare Decision Maker Relationship (ie \"Primary\")  X- MPOA   Daughter Jodie Raya  2nd MPOA                  Transition of Care Plan:       Home with family support and medical follow up        CM met with patient in her room to introduce self and explain role. Patient was alert and oriented. Confirmed demographics. PCP and insurance [facility]. 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 She was self care and independent prior to admission. She works and handles her own affairs-- drives herself to work and daily activities. She is close with her daughter and x  lives 12 minutes away from her. They maintain good relationship    Patient plans to return home when discharged   Will have a ride home with family. Cm will follow and assist with any transition of care needs that arise.        Care Management Interventions  PCP Verified by CM:  Yes  Mode of Transport at Discharge:  (family in car)  Transition of Care Consult (CM Consult): Discharge Planning  Discharge Durable Medical Equipment: No  Physical Therapy Consult: Yes  Current Support Network: Own Home (lives in own one level home-- self care and independent prior to admission    Has AMD in chart)  Confirm Follow Up Transport: Family (friends and self when able)  Discharge Location  Discharge Placement: Home

## 2021-06-22 NOTE — PROGRESS NOTES
Neurointerventional Surgery Progress Note   Shimon Mcwilliams NP    Patient: Ailyn Petersen MRN: [id number]  SSN: [id number]    YOB: 1955  Age: 61 y.o. Sex: female      Subjective:       NIH remains 1. MRI completed which shows few punctate infarcts in right cerebellum, midbrain and cesar. Patient with increased right facial numbness overnight with stat head CT which showed no significant changes. Plan:     Repeat CTA Head/Neck on Thursday   Transition to Lovenox     Plan:   Right vertebral artery thrombus   - As seen on CTA H/N 6/ 20/ 21. Nonocclusive.    - Heparin gtt x 3 days with PTT goal 60-80; no bolus ---> transition to therapeutic lovenox (               discussed with Dr. Trever Bravo)    - Repeat CTA H/N on Thursday    - MRI shows few punctate foci of possible acute infarct in the right cerebellum, midbrain and               cesar    - Q1 hour neuro checks; d/t length of thrombus and central location within vessel she is at high risk for deterioration should it move distally   - Soft cervical collar to decrease neck ROM and osteophyte irritation on vessel wall    - Please call NIS, code stroke, and repeat CT/CTA with any acute changes     Past Medical History:   Diagnosis Date    Alopecia     Arthritis     shoulders    Asthma     [facility] 2013    ENDOMETRIAL    Edema     Nausea & vomiting     Polyp of colon     Thyroid disease     hypothyroidism     Family History   Problem Relation Age of Onset    Cancer Paternal Grandfather         COLON    Diabetes Father     Heart Disease Father     Hypertension Sister     Diabetes Sister     Lung Disease Sister         Durand Boxer    Thyroid Disease Daughter      Social History     Tobacco Use    Smoking status: Never Smoker    Smokeless tobacco: Never Used   Substance Use Topics    Alcohol use: Yes     Comment: RARELY      Prior to Admission Medications   Prescriptions Last Dose Informant Patient Reported? Taking? Cholecalciferol, Vitamin D3, 5,000 unit Tab   Yes No   Sig: Take  by mouth. OTHER   Yes No   Thyroid, Pork, (ARMOUR THYROID) 240 mg Tab   Yes No   Sig: Take  by mouth daily. Thyroid, Pork, 120 mg tab   Yes No   Sig: Take 120 mg by mouth daily. cyanocobalamin (VITAMIN B-12) 1,000 mcg tablet   Yes No   Sig: Take 1,000 mcg by mouth daily. spironolactone (ALDACTONE) 50 mg tablet   Yes No   Sig: Take  by mouth daily. Facility-Administered Medications: None       Allergies   Allergen Reactions    Demerol [Meperidine] Other (comments)     nightmares    Erythromycin Itching     History of Presenting Illness:       Ivan Taylor is a 61 y.o. female with a pmhx of hypothyroidism, asthma and arthritis who presented to the ED on 6/20/21 for dizziness and sensory changes to the left side of her body. She reports she woke yesterday in her usual state of health and attended [employer] as she usually does on Sundays. After [employer] she noticed she was walking and her body was pulling her to the right. She reports she had to hold onto the shopping cart much like a walker because she felt she would walk to the right if she didn't. She denied any focal weakness, syncope, visual disturbances, or speech difficulties. She felt her symptoms were related to some kind of vertigo so she tried taking a nap which was unsuccessful in decreasing her dizziness. She also reports feeling her posterior left back was very hot against the couch, but did not experience this same sensation on the right. At the time of our exam she feels her left face, arm, and leg are all very warm and feel different than her right. A code stroke was called and CT head was performed showing no acute abnormality. 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  She denies any neck pain, trauma, recent MVC, or chiropractic appointments. She was started on a heparin gtt, placed in a cervical collar, and transferred to [facility] ICU for further monitoring and management. Review of Systems:  Pertinent items are noted in the History of Present Illness. Denies numbness, tingling, chest pain, leg pain, nausea, vomiting, difficulty swallowing, headache, and dyspnea. Objective:     Vitals:    06/22/21 0600 06/22/21 1001 06/22/21 1440 06/22/21 1555   BP: 121/86 133/83 128/83 (!) 143/86   Pulse: 74 67 76 72   Resp: 19 16 22 23   Temp: 97.8 °F (36.6 °C) 97.3 °F (36.3 °C) 97.2 °F (36.2 °C) 98.2 °F (36.8 °C)   SpO2: 96% 94% 93% 96%   Weight:       Height:          Physical Exam:  GENERAL: Calm, cooperative, NAD, [place] collar in place   SKIN: Warm, dry, color appropriate for ethnicity. Neurologic Exam:  Mental Status:  Alert and oriented x 4. Appropriate affect, mood and behavior. Language:    Normal fluency, repetition, comprehension and naming. Cranial Nerves:   Right pupil 3 mm, left 4mm, both round and reactive to light. Visual fields full to confrontation. Extraocular movements intact. Facial sensation intact. Full facial strength, no asymmetry. Hearing grossly intact bilaterally. No dysarthria. Tongue protrudes to midline, palate elevates symmetrically. Shoulder shrug 5/5 bilaterally. Motor:    No pronator drift. Bulk and tone normal.      5/5 power in all extremities proximally and distally. No involuntary movements. Sensation:    Sensation intact throughout to light touch. Does report subjective temperature changes on left side. Coordination & Gait: Gait deferred. FTN and HTS intact with no ataxia present.     Labs:  Lab Results   Component Value Date/Time    WBC 6.1 06/22/2021 06:40 AM    HGB 13.7 06/22/2021 06:40 AM    HCT 42.7 06/22/2021 06:40 AM    PLATELET 172 25/24/5001 06:40 AM    MCV 93.6 06/22/2021 06:40 AM      Lab Results   Component Value Date/Time    Sodium 141 06/22/2021 06:40 AM    Potassium 4.2 06/22/2021 06:40 AM    Chloride 109 (H) 06/22/2021 06:40 AM    CO2 25 06/22/2021 06:40 AM    Anion gap 7 06/22/2021 06:40 AM    Glucose 96 06/22/2021 06:40 AM    BUN 12 06/22/2021 06:40 AM    Creatinine 0.76 06/22/2021 06:40 AM    BUN/Creatinine ratio 16 06/22/2021 06:40 AM    GFR est AA >60 06/22/2021 06:40 AM    GFR est non-AA >60 06/22/2021 06:40 AM    Calcium 9.2 06/22/2021 06:40 AM       Imaging:  CT Results (maximum last 3): Results from Hospital Encounter encounter on 06/20/21    CTA CODE NEURO HEAD AND NECK W CONT    Narrative  **PRELIMINARY REPORT**    Nonocclusive thrombus in the right vertebral artery. No acute intracranial  thrombosis or significant intracranial stenosis. Preliminary report was provided by Dr. Brad Grace, the on-call radiologist, and  called to Dr. Marivel Perry in the emergency department at 10:29 PM.    Final report to follow. **END PRELIMINARY REPORT**      CT CODE NEURO HEAD WO CONTRAST    Narrative  INDICATION: Left-sided weakness and numbness    TECHNIQUE: 5 mm axial images were obtained from the skull base through the  vertex. CT dose reduction was achieved through use of a standardized protocol  tailored for this examination and automatic exposure control for dose  modulation. FINDINGS: The ventricles and cortical sulci are appropriate in size and  configuration. There is no evidence of intracranial hemorrhage, mass, mass  effect, or acute infarct. No extra-axial fluid collections are seen. The  visualized paranasal sinuses and mastoid air cells are clear. The orbital  structures are unremarkable. No osseous abnormalities are seen. Impression  No acute intracranial process.     Assessment:     Hospital Problems  Date Reviewed: 6/22/2021        Codes Class Noted POA    * (Principal) Vertebral artery occlusion, right ICD-10-CM: I65.01  ICD-9-CM: 433.20  6/20/2021 Unknown                    Signed By: Talha Watt Peg Berman NP     June 22, 2021

## 2021-06-22 NOTE — PROGRESS NOTES
Neurology Progress Note  Rylie Gamble NP    Patient: Debra Soriano MRN: [id number]  SSN: [id number]    YOB: 1955  Age: 61 y.o. Sex: female      Chief Complaint: dizziness     HPI:  Debra Soriano is a 61 y.o. female with a past medical history of hypothyroidism, asthma and arthritis who presented to the ED on 6/20/21 for dizziness and sensory changes to the left side of her body. She reports she woke yesterday in her usual state of health and attended [employer] as she usually does on Sundays. After [employer] she noticed she was walking and her body was pulling her to the right. She reports she had to hold onto the shopping cart much like a walker because she felt she would walk to the right if she didn't. She denied any focal weakness, syncope, visual disturbances, or speech difficulties. She felt her symptoms were related to some kind of vertigo so she tried taking a nap which was unsuccessful in decreasing her dizziness. She also reports feeling her posterior left back was very hot against the couch, but did not experience this same sensation on the right. At the time of our exam she feels her left face, arm, and leg are all very warm and feel different than her right. Patient also had right facial pressure that felt like a the start of a sinus headache. A code stroke was called and CT head was performed showing no acute abnormality. 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 She denies any neck pain, trauma, recent MVC, or chiropractic appointments. She was started on a heparin gtt, placed in a cervical collar, and transferred to [facility] ICU for further monitoring and management.        Subjective:    A code S was called yesterday morning due to new right sided facial numbness and right upper lip numbness with a feeling of sinus pressure behind her right eye as well as right leg weakness. The right leg weakness had resolved. The right facial numbness is coming and going. Is currently numb on her right face and right upper lip at present but was improved earlier in the evening. MRI shows few punctate foci of possible acute infarct in the right cerebellum, midbrain and cesar. Past Medical History:   Diagnosis Date    Alopecia     Arthritis     shoulders    Asthma     Cancer ([facility] 2013    ENDOMETRIAL    Edema     Nausea & vomiting     Polyp of colon     Thyroid disease     hypothyroidism     Family History   Problem Relation Age of Onset    Cancer Paternal Grandfather         COLON    Diabetes Father     Heart Disease Father     Hypertension Sister     Diabetes Sister     Lung Disease Sister         Melida Дмитрий    Thyroid Disease Daughter      Social History     Tobacco Use    Smoking status: Never Smoker    Smokeless tobacco: Never Used   Substance Use Topics    Alcohol use: Yes     Comment: RARELY      Prior to Admission Medications   Prescriptions Last Dose Informant Patient Reported? Taking? Cholecalciferol, Vitamin D3, 5,000 unit Tab   Yes No   Sig: Take  by mouth. OTHER   Yes No   Thyroid, Pork, (ARMOUR THYROID) 240 mg Tab   Yes No   Sig: Take  by mouth daily. Thyroid, Pork, 120 mg tab   Yes No   Sig: Take 120 mg by mouth daily. cyanocobalamin (VITAMIN B-12) 1,000 mcg tablet   Yes No   Sig: Take 1,000 mcg by mouth daily. spironolactone (ALDACTONE) 50 mg tablet   Yes No   Sig: Take  by mouth daily. Facility-Administered Medications: None       Allergies   Allergen Reactions    Demerol [Meperidine] Other (comments)     nightmares    Erythromycin Itching       Review of Systems:      Denies  chest pain, leg pain, nausea, weakness, vomiting, difficulty swallowing, headache, visual disturbances and dyspnea. Complains of right facial numbness.      Objective:     Vitals:    06/22/21 0000 06/22/21 0100 06/22/21 0132 06/22/21 0200   BP: 123/89 (!) 151/87  137/85   Pulse: 61 63  64   Resp: 15 16  18   Temp: 97.7 °F (36.5 °C)      SpO2: 95% 95% 96% 96%   Weight:       Height:          Physical Exam:  GENERAL: Calm, cooperative, NAD, [place] collar in place   SKIN: Warm, dry, color appropriate for ethnicity. Neurologic Exam:  Mental Status:  Alert and oriented x 4. Appropriate affect, mood and behavior. Language:    Normal fluency, repetition, comprehension and naming. Cranial Nerves:   Right pupil 2 mm, left 2.5 mm, both round and reactive to light. Visual fields full to confrontation. Extraocular movements intact. Facial sensation intact. Full facial strength, no asymmetry. Hearing grossly intact bilaterally. No dysarthria. Tongue protrudes to midline, palate elevates symmetrically. Shoulder shrug 5/5 bilaterally. Motor:    No pronator drift. Bulk and tone normal.      5/5 power in all extremities proximally and distally. No involuntary movements. Sensation:    Sensation intact throughout to light touch. Reports subjective decreased sensation to right upper lip and right cheek bone with mild pressure sensation. Coordination & Gait: Gait steady. FTN and HTS intact with no ataxia present.     Labs:  Lab Results   Component Value Date/Time    WBC 6.4 06/21/2021 05:16 AM    HGB 13.2 06/21/2021 05:16 AM    HCT 40.8 06/21/2021 05:16 AM    PLATELET 665 77/10/5043 05:16 AM    MCV 93.4 06/21/2021 05:16 AM      Lab Results   Component Value Date/Time    Sodium 138 06/21/2021 05:16 AM    Potassium 3.6 06/21/2021 05:16 AM    Chloride 109 (H) 06/21/2021 05:16 AM    CO2 24 06/21/2021 05:16 AM    Anion gap 5 06/21/2021 05:16 AM    Glucose 137 (H) 06/21/2021 05:16 AM    BUN 14 06/21/2021 05:16 AM    Creatinine 0.89 06/21/2021 05:16 AM    BUN/Creatinine ratio 16 06/21/2021 05:16 AM    GFR est AA >60 06/21/2021 05:16 AM    GFR est non-AA >60 06/21/2021 05:16 AM    Calcium 8.7 06/21/2021 05:16 AM       Imaging:  CT Results (maximum last 3): Results from Hospital Encounter encounter on 06/20/21    CTA CODE NEURO HEAD AND NECK W CONT    Narrative  **PRELIMINARY REPORT**    Nonocclusive thrombus in the right vertebral artery. No acute intracranial  thrombosis or significant intracranial stenosis. Preliminary report was provided by Dr. Daphney Almanzar, the on-call radiologist, and  called to Dr. Ankit Brown in the emergency department at 10:29 PM.    Final report to follow. **END PRELIMINARY REPORT**      CT CODE NEURO HEAD WO CONTRAST    Narrative  INDICATION: Left-sided weakness and numbness    TECHNIQUE: 5 mm axial images were obtained from the skull base through the  vertex. CT dose reduction was achieved through use of a standardized protocol  tailored for this examination and automatic exposure control for dose  modulation. FINDINGS: The ventricles and cortical sulci are appropriate in size and  configuration. There is no evidence of intracranial hemorrhage, mass, mass  effect, or acute infarct. No extra-axial fluid collections are seen. The  visualized paranasal sinuses and mastoid air cells are clear. The orbital  structures are unremarkable. No osseous abnormalities are seen. Impression  No acute intracranial process. Assessment:     Hospital Problems  Date Reviewed: 6/22/2021        Codes Class Noted POA    * (Principal) Vertebral artery occlusion, right ICD-10-CM: I65.01  ICD-9-CM: 433.20  6/20/2021 Unknown            Plan:   Right vertebral artery thrombus   - As seen on CTA H/N 6/ 20/ 21. Nonocculsive. - Heparin gtt x 3 days neuro protocol with no bolus   - Repeat CTA in 3 days   - MRI shows few punctate foci of possible acute infarct in the right cerebellum, midbrain and cesar.    - Q2 hour neuro checks   -Okay for NSTU intermediate if bed needed in ICU   - Soft cervical collar to decrease neck ROM and osteophyte irritation on vessel wall    - Please call NIS, code stroke, and repeat CT/CTA with any acute changes     [facility]um[facility] due to increased right facial numbness. Results show no significant change from prior study. I have discussed the diagnosis and the intended plan as seen in the above orders with Dr. Lizbeth Sprague, and Dr. Corinne Arredondo, patient and primary RN. Further recommendations to follow.        Signed By: Maggie Arias NP     June 22, 2021

## 2021-06-22 NOTE — PROGRESS NOTES
Problem: Self Care Deficits Care Plan (Adult)  Goal: *Acute Goals and Plan of Care (Insert Text)  Description: FUNCTIONAL STATUS PRIOR TO ADMISSION: Patient was independent and active without use of DME.     HOME SUPPORT: The patient lived alone with daughter in area to provide assistance. Occupational Therapy Goals  Initiated 6/21/2021  1. Patient will perform ADLs standing 5 mins without fatigue or LOB IND within 7 day(s). 2.  Patient will perform lower body ADLs IND within 7  day(s). 3.  Patient will perform gathering ADL items high and low 2/2 IND within 7 day(s). 4.  Patient will perform toilet transfers IND within 7 day(s). 5.  Patient will perform all aspects of toileting IND within 7 day(s). 6.  Patient will participate in cardiac/sternal upper extremity therapeutic exercise/activities to increase independence with ADLs IND within 7 day(s). Outcome: Resolved/Met     OCCUPATIONAL THERAPY TREATMENT/DISCHARGE  Patient: Yaniv Wood (15 y.o. female)  Date: 6/22/2021  Diagnosis: Vertebral artery occlusion, right [I65.01] Vertebral artery occlusion, right       Precautions: Fall, C-collar  Chart, occupational therapy assessment, plan of care, and goals were reviewed. ASSESSMENT  Patient continues with skilled OT services and has progressed towards goals, now functioning at Mod I-SPV level for ADLs and functional mobility. Improved attention to the R however 1 cue provided for use of dominant RUE with functional reaching tasks with good carryover. Reviewed extensive home safety and adaptive ADL techniques in prep for d/c home, patient acknowledging understanding with no concerns. No further acute OT needs, safe to d/c home with friends/family support PRN when medically stable. Current Level of Function (ADLs/self-care):  Mod I-SPV for ADLs and mobility    Other factors to consider for discharge: lives alone, acute CVA--encouraged discussion with neuro re: driving         PLAN :  Rationale for discharge: Goals achieved  Recommend with staff: Recommend with nursing, ADLs with supervision/setup in bathroom, OOB to chair 3x/day via and toileting via functional mobility to and from bathroom. Thank you for completing as able in order to maintain patient strength, endurance and independence. Recommendation for discharge: (in order for the patient to meet his/her long term goals)  No skilled occupational therapy/ follow up rehabilitation needs identified at this time. This discharge recommendation:  Has been made in collaboration with the attending provider and/or case management    IF patient discharges home will need the following DME:patient owns DME required for discharge       SUBJECTIVE:   Patient stated I feel pretty good.     OBJECTIVE DATA SUMMARY:   Cognitive/Behavioral Status:  Neurologic State: Alert  Orientation Level: Oriented X4  Cognition: Appropriate decision making; Appropriate for age attention/concentration; Appropriate safety awareness; Follows commands  Perception: Cues to attend to right side of body (min cues)  Perseveration: No perseveration noted  Safety/Judgement: Awareness of environment; Fall prevention    Functional Mobility and Transfers for ADLs:  Bed Mobility:  Supine to Sit: Modified independent  Sit to Supine: Modified independent  Scooting: Independent    Transfers:  Sit to Stand: Supervision  Functional Transfers  Bathroom Mobility: Supervision/set up  Toilet Transfer : Supervision  Tub Transfer: Supervision       Balance:  Sitting: Intact  Standing: Intact; Without support  Standing - Static: Good  Standing - Dynamic : Good    ADL Intervention:       Grooming  Grooming Assistance: Supervision  Position Performed: Standing (at sink)  Washing Hands: Supervision      Lower Body Dressing Assistance  Dressing Assistance: Supervision  Underpants: Compensatory technique training  Pants With Elastic Waist: Supervision; Compensatory technique training  Pants With Button/Zipper: Compensatory technique training  Socks: Modified independent  Leg Crossed Method Used: Yes (prop sit with RLE d/t decreased hip ER)  Position Performed: Seated edge of bed;Standing  Cues: Verbal cues provided    Toileting  Toileting Assistance: Supervision  Bladder Hygiene: Independent  Clothing Management: Supervision    Cognitive Retraining  Safety/Judgement: Awareness of environment; Fall prevention    Patient instructed and indicated understanding the benefits of maintaining activity tolerance, functional mobility, and independence with self care tasks during acute stay  to ensure safe return home and to baseline. Encouraged patient to increase frequency and duration OOB, be out of bed for all meals, perform daily ADLs (as approved by RN/MD regarding bathing etc), and performing functional mobility to/from bathroom. Patient instructed and indicated understanding home modifications (ie raise height of ADL objects, appropriate chair heights, recliner safety), safety (ie change of floor surfaces, clear pathways), to increase independence and fall prevention. Reviewed adaptive techniques for ADLs and mobility to prevent cervical rotation and flexion, good carryover with functional reaching tasks in upper and lower cabinets with SPV. Pain:  None reported    Activity Tolerance:   Good    After treatment patient left in no apparent distress:   Supine in bed, Call bell within reach, and Side rails x 3    COMMUNICATION/COLLABORATION:   The patients plan of care was discussed with: Physical therapist and Registered nurse.      QUENTIN Bravo, OTR/L  Time Calculation: 24 mins

## 2021-06-22 NOTE — PROGRESS NOTES
Pt seen and examined. Downgraded from ICU last night, and seen by my colleague NP Bijan Post    In brief 63y/o who presented with dizziness, and right sided lean, facial numbness, who was found to have R vertebral artery thrombus with semi occlusion. Started on heparin gtt and transferred to ICU for further monitoring. Appears there is an osteophyte in close proximity to the thrombus, and may have been the cuase of thrombus formation. MRI revealed acute infarcts in the R cerebellum. - Continue heparin gtt per neuro and neuroIR recs  - Soft cervical collar  - Plan repeat CTA 6/23  - PT/OT following  - ?if osteophyte ultimately will need to be resected?  Will discuss with MARIAH and neurology

## 2021-06-22 NOTE — PROGRESS NOTES
Exam stable (NIH 1 right V2 facial sensory changes). MRI reviewed. Punctate embolic infarcts in the cerebellum, midbrain and cesar. No hemorrhagic changes. CTA tomorrow. Will likely transition to [address] prior to discharge.      STEFANI

## 2021-06-23 ENCOUNTER — APPOINTMENT (OUTPATIENT)
Dept: NON INVASIVE DIAGNOSTICS | Age: 66
DRG: 064 | End: 2021-06-23
Attending: PSYCHIATRY & NEUROLOGY
Payer: COMMERCIAL

## 2021-06-23 LAB
ANION GAP SERPL CALC-SCNC: 6 MMOL/L (ref 5–15)
APTT PPP: 29.3 SEC (ref 22.1–31)
AT III PPP CHRO-ACNC: 102 % (ref 75–135)
BASOPHILS # BLD: 0.1 K/UL (ref 0–0.1)
BASOPHILS NFR BLD: 1 % (ref 0–1)
BUN SERPL-MCNC: 13 MG/DL (ref 6–20)
BUN/CREAT SERPL: 15 (ref 12–20)
CALCIUM SERPL-MCNC: 9.6 MG/DL (ref 8.5–10.1)
CHLORIDE SERPL-SCNC: 108 MMOL/L (ref 97–108)
CO2 SERPL-SCNC: 25 MMOL/L (ref 21–32)
CREAT SERPL-MCNC: 0.86 MG/DL (ref 0.55–1.02)
DIFFERENTIAL METHOD BLD: NORMAL
EOSINOPHIL # BLD: 0.2 K/UL (ref 0–0.4)
EOSINOPHIL NFR BLD: 2 % (ref 0–7)
ERYTHROCYTE [DISTWIDTH] IN BLOOD BY AUTOMATED COUNT: 13 % (ref 11.5–14.5)
GLUCOSE SERPL-MCNC: 109 MG/DL (ref 65–100)
HCT VFR BLD AUTO: 43.1 % (ref 35–47)
HEXAGONAL PHASE PHOSPHOLIPID, 117839: 0 SEC (ref 0–11)
HGB BLD-MCNC: 14 G/DL (ref 11.5–16)
IMM GRANULOCYTES # BLD AUTO: 0 K/UL (ref 0–0.04)
IMM GRANULOCYTES NFR BLD AUTO: 0 % (ref 0–0.5)
INTERPRETATION, 117893: ABNORMAL
LYMPHOCYTES # BLD: 2.3 K/UL (ref 0.8–3.5)
LYMPHOCYTES NFR BLD: 33 % (ref 12–49)
MAGNESIUM SERPL-MCNC: 1.8 MG/DL (ref 1.6–2.4)
MCH RBC QN AUTO: 30.2 PG (ref 26–34)
MCHC RBC AUTO-ENTMCNC: 32.5 G/DL (ref 30–36.5)
MCV RBC AUTO: 93.1 FL (ref 80–99)
MONOCYTES # BLD: 0.7 K/UL (ref 0–1)
MONOCYTES NFR BLD: 10 % (ref 5–13)
NEUTS SEG # BLD: 3.7 K/UL (ref 1.8–8)
NEUTS SEG NFR BLD: 54 % (ref 32–75)
NRBC # BLD: 0 K/UL (ref 0–0.01)
NRBC BLD-RTO: 0 PER 100 WBC
PHOSPHATE SERPL-MCNC: 3.7 MG/DL (ref 2.6–4.7)
PLATELET # BLD AUTO: 219 K/UL (ref 150–400)
PMV BLD AUTO: 11 FL (ref 8.9–12.9)
POTASSIUM SERPL-SCNC: 4 MMOL/L (ref 3.5–5.1)
PROT C AG PPP IA-ACNC: 120 % (ref 60–150)
PROT C PPP-ACNC: 141 % (ref 73–180)
PROT S AG ACT/NOR PPP IA: 100 % (ref 60–150)
PROT S FREE AG ACT/NOR PPP IA: 143 % (ref 57–157)
PTT-LA INCUB MIX, 117036: 52.6 SEC (ref 0–48.9)
PTT-LA MIX, LUPR1T: 47 SEC (ref 0–48.9)
RBC # BLD AUTO: 4.63 M/UL (ref 3.8–5.2)
SCREEN APTT: 53 SEC (ref 0–51.9)
SCREEN DRVVT: 40.6 SEC (ref 0–47)
SODIUM SERPL-SCNC: 139 MMOL/L (ref 136–145)
THERAPEUTIC RANGE,PTTT: NORMAL SECS (ref 58–77)
WBC # BLD AUTO: 6.9 K/UL (ref 3.6–11)

## 2021-06-23 PROCEDURE — 74011250636 HC RX REV CODE- 250/636: Performed by: NURSE PRACTITIONER

## 2021-06-23 PROCEDURE — 74011250637 HC RX REV CODE- 250/637: Performed by: STUDENT IN AN ORGANIZED HEALTH CARE EDUCATION/TRAINING PROGRAM

## 2021-06-23 PROCEDURE — 84100 ASSAY OF PHOSPHORUS: CPT

## 2021-06-23 PROCEDURE — 97116 GAIT TRAINING THERAPY: CPT

## 2021-06-23 PROCEDURE — 85730 THROMBOPLASTIN TIME PARTIAL: CPT

## 2021-06-23 PROCEDURE — 65660000000 HC RM CCU STEPDOWN

## 2021-06-23 PROCEDURE — 99231 SBSQ HOSP IP/OBS SF/LOW 25: CPT | Performed by: RADIOLOGY

## 2021-06-23 PROCEDURE — 83735 ASSAY OF MAGNESIUM: CPT

## 2021-06-23 PROCEDURE — 99233 SBSQ HOSP IP/OBS HIGH 50: CPT | Performed by: PSYCHIATRY & NEUROLOGY

## 2021-06-23 PROCEDURE — 85025 COMPLETE CBC W/AUTO DIFF WBC: CPT

## 2021-06-23 PROCEDURE — 80048 BASIC METABOLIC PNL TOTAL CA: CPT

## 2021-06-23 PROCEDURE — 36415 COLL VENOUS BLD VENIPUNCTURE: CPT

## 2021-06-23 PROCEDURE — 74011250637 HC RX REV CODE- 250/637: Performed by: NURSE PRACTITIONER

## 2021-06-23 RX ADMIN — ENOXAPARIN SODIUM 100 MG: 100 INJECTION SUBCUTANEOUS at 06:33

## 2021-06-23 RX ADMIN — ATORVASTATIN CALCIUM 40 MG: 40 TABLET, FILM COATED ORAL at 21:57

## 2021-06-23 RX ADMIN — Medication 10 ML: at 06:00

## 2021-06-23 RX ADMIN — SODIUM CHLORIDE, POTASSIUM CHLORIDE, SODIUM LACTATE AND CALCIUM CHLORIDE 75 ML/HR: 600; 310; 30; 20 INJECTION, SOLUTION INTRAVENOUS at 06:33

## 2021-06-23 RX ADMIN — ENOXAPARIN SODIUM 100 MG: 100 INJECTION SUBCUTANEOUS at 17:03

## 2021-06-23 RX ADMIN — LEVOTHYROXINE, LIOTHYRONINE 90 MG: 38; 9 TABLET ORAL at 08:38

## 2021-06-23 RX ADMIN — Medication 10 ML: at 13:44

## 2021-06-23 NOTE — PROGRESS NOTES
Neurointerventional Surgery Progress Note   Ria Cherry NP    Patient: Sai Floor MRN: [id number]  SSN: [id number]    YOB: 1955  Age: 61 y.o. Sex: female      Subjective:      Patient no longer has any sensation abnormalities today. NIH is now 0. No acute events overnight. Hopeful for discharge tomorrow after repeat imaging. Plan:     Repeat CTA Head/Neck on Thursday   Early on therapeutic Lovenox, will make decision regarding NOAC vs. Lovenox as an outpatient after imaging reviewed     Plan:   Right vertebral artery thrombus   - As seen on CTA H/N 6/ 20/ 21. Nonocclusive.    - Heparin gtt x 3 days now on therapeutic lovenox     - Repeat CTA H/N on Thursday    - MRI shows few punctate foci of possible acute infarct in the right cerebellum, midbrain and               cesar    - Q4 neuro checks ok at this point    - Soft cervical collar to decrease neck ROM and osteophyte irritation on vessel wall    - Please call NIS, code stroke, and repeat CT/CTA with any acute changes     Past Medical History:   Diagnosis Date    Alopecia     Arthritis     shoulders    Asthma     Cancer ([facility] [address] 2013    ENDOMETRIAL    Edema     Nausea & vomiting     Polyp of colon     Thyroid disease     hypothyroidism     Family History   Problem Relation Age of Onset    Cancer Paternal Grandfather         COLON    Diabetes Father     Heart Disease Father     Hypertension Sister     Diabetes Sister     Lung Disease Sister         Briana Suarez    Thyroid Disease Daughter      Social History     Tobacco Use    Smoking status: Never Smoker    Smokeless tobacco: Never Used   Substance Use Topics    Alcohol use: Yes     Comment: RARELY      Prior to Admission Medications   Prescriptions Last Dose Informant Patient Reported? Taking? Cholecalciferol, Vitamin D3, 5,000 unit Tab   Yes No   Sig: Take  by mouth.    OTHER   Yes No   Thyroid, Pork, (ARMOUR THYROID) 240 mg Tab   Yes No   Sig: Take  by mouth daily.   Thyroid, Pork, 120 mg tab   Yes No   Sig: Take 120 mg by mouth daily. cyanocobalamin (VITAMIN B-12) 1,000 mcg tablet   Yes No   Sig: Take 1,000 mcg by mouth daily. spironolactone (ALDACTONE) 50 mg tablet   Yes No   Sig: Take  by mouth daily. Facility-Administered Medications: None       Allergies   Allergen Reactions    Demerol [Meperidine] Other (comments)     nightmares    Erythromycin Itching     History of Presenting Illness:       Pacheco Brand is a 61 y.o. female with a pmhx of hypothyroidism, asthma and arthritis who presented to the ED on 6/20/21 for dizziness and sensory changes to the left side of her body. She reports she woke yesterday in her usual state of health and attended Buddhism as she usually does on Sundays. After Buddhism she noticed she was walking and her body was pulling her to the right. She reports she had to hold onto the shopping cart much like a walker because she felt she would walk to the right if she didn't. She denied any focal weakness, syncope, visual disturbances, or speech difficulties. She felt her symptoms were related to some kind of vertigo so she tried taking a nap which was unsuccessful in decreasing her dizziness. She also reports feeling her posterior left back was very hot against the couch, but did not experience this same sensation on the right. At the time of our exam she feels her left face, arm, and leg are all very warm and feel different than her right. A code stroke was called and CT head was performed showing no acute abnormality. 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 She denies any neck pain, trauma, recent MVC, or chiropractic appointments. She was started on a heparin gtt, placed in a cervical collar, and transferred to [facility] ICU for further monitoring and management.        Review of Systems:  Pertinent items are noted in the History of Present Illness. Denies numbness, tingling, chest pain, leg pain, nausea, vomiting, difficulty swallowing, headache, and dyspnea. Objective:     Vitals:    06/23/21 0200 06/23/21 0600 06/23/21 1004 06/23/21 1408   BP: (!) 145/92 (!) 153/87 126/80 (!) 140/67   Pulse: 73 70 72 68   Resp: 21 21 21 16   Temp: 97.4 °F (36.3 °C) 98.6 °F (37 °C) 98.4 °F (36.9 °C) 98.4 °F (36.9 °C)   SpO2: 96% 95% 94% 96%   Weight: 226 lb 11.2 oz (102.8 kg)      Height:          Physical Exam:  GENERAL: Calm, cooperative, NAD, [place] collar in place   SKIN: Warm, dry, color appropriate for ethnicity. Neurologic Exam:  Mental Status:  Alert and oriented x 4. Appropriate affect, mood and behavior. Language:    Normal fluency, repetition, comprehension and naming. Cranial Nerves:   Right pupil 3 mm, left 4mm, both round and reactive to light. Visual fields full to confrontation. Extraocular movements intact. Facial sensation intact. Full facial strength, no asymmetry. Hearing grossly intact bilaterally. No dysarthria. Tongue protrudes to midline, palate elevates symmetrically. Shoulder shrug 5/5 bilaterally. Motor:    No pronator drift. Bulk and tone normal.      5/5 power in all extremities proximally and distally. No involuntary movements. Sensation:    Sensation intact throughout to light touch    Coordination & Gait: Gait deferred. FTN and HTS intact with no ataxia present.     Labs:  Lab Results   Component Value Date/Time    WBC 6.9 06/23/2021 02:40 AM    HGB 14.0 06/23/2021 02:40 AM    HCT 43.1 06/23/2021 02:40 AM    PLATELET 693 82/70/4060 02:40 AM    MCV 93.1 06/23/2021 02:40 AM      Lab Results   Component Value Date/Time    Sodium 139 06/23/2021 02:40 AM    Potassium 4.0 06/23/2021 02:40 AM    Chloride 108 06/23/2021 02:40 AM    CO2 25 06/23/2021 02:40 AM    Anion gap 6 06/23/2021 02:40 AM    Glucose 109 (H) 06/23/2021 02:40 AM    BUN 13 06/23/2021 02:40 AM    Creatinine 0.86 06/23/2021 02:40 AM    BUN/Creatinine ratio 15 06/23/2021 02:40 AM    GFR est AA >60 06/23/2021 02:40 AM    GFR est non-AA >60 06/23/2021 02:40 AM    Calcium 9.6 06/23/2021 02:40 AM       Imaging:  CT Results (maximum last 3): Results from Hospital Encounter encounter on 06/20/21    CTA CODE NEURO HEAD AND NECK W CONT    Narrative  **PRELIMINARY REPORT**    Nonocclusive thrombus in the right vertebral artery. No acute intracranial  thrombosis or significant intracranial stenosis. Preliminary report was provided by Dr. Chani Rodrigues, the on-call radiologist, and  called to Dr. Sangeeta Clark in the emergency department at 10:29 PM.    Final report to follow. **END PRELIMINARY REPORT**      CT CODE NEURO HEAD WO CONTRAST    Narrative  INDICATION: Left-sided weakness and numbness    TECHNIQUE: 5 mm axial images were obtained from the skull base through the  vertex. CT dose reduction was achieved through use of a standardized protocol  tailored for this examination and automatic exposure control for dose  modulation. FINDINGS: The ventricles and cortical sulci are appropriate in size and  configuration. There is no evidence of intracranial hemorrhage, mass, mass  effect, or acute infarct. No extra-axial fluid collections are seen. The  visualized paranasal sinuses and mastoid air cells are clear. The orbital  structures are unremarkable. No osseous abnormalities are seen. Impression  No acute intracranial process.     Assessment:     Hospital Problems  Date Reviewed: 6/22/2021        Codes Class Noted POA    * (Principal) Vertebral artery occlusion, right ICD-10-CM: I65.01  ICD-9-CM: 433.20  6/20/2021 Unknown                    Signed By: Nelda Gutierrez NP     June 23, 2021

## 2021-06-23 NOTE — PROGRESS NOTES
Bedside and Verbal shift change report given to Melanie/SHAN Eckert's (oncoming nurse) by Deven Mar RN (offgoing nurse). Report included the following information SBAR, Kardex, Intake/Output, MAR, Accordion, Recent Results, Cardiac Rhythm NSR and Quality Measures.

## 2021-06-23 NOTE — PROGRESS NOTES
Problem: Mobility Impaired (Adult and Pediatric)  Goal: *Acute Goals and Plan of Care (Insert Text)  Description: FUNCTIONAL STATUS PRIOR TO ADMISSION: Patient was independent and active without use of DME.    HOME SUPPORT PRIOR TO ADMISSION: The patient lived alone with children nearby to assist.    Physical Therapy Goals  Initiated 6/21/2021  1. Patient will move from supine to sit and sit to supine , scoot up and down, and roll side to side in bed with independence within 7 day(s). 2.  Patient will transfer from bed to chair and chair to bed with independence using the least restrictive device within 7 day(s). 3.  Patient will perform sit to stand with independence within 7 day(s). 4.  Patient will ambulate with supervision for 150 feet with the least restrictive device within 7 day(s). 5.  Patient will ascend/descend 3 stairs with 1 handrail(s) with contact guard assist within 7 day(s). Outcome: Progressing Towards Goal     PHYSICAL THERAPY TREATMENT  Patient: Nancy Jenkins (42 y.o. female)  Date: 6/23/2021  Diagnosis: Vertebral artery occlusion, right [I65.01] Vertebral artery occlusion, right       Precautions: Fall, neck brace  Chart, physical therapy assessment, plan of care and goals were reviewed. ASSESSMENT  Patient continues with skilled PT services and is progressing towards goals. The patient continues to present with impaired dynamic standing balance, decreased gait speed, and overall decline in functional mobility s/p admission for R vertebral artery occlusion. Of note, pt's MRI positive for infarcts in L cerebellum, midbrain, and cesar, and plan is to repeat CTA tomorrow. This date, session focused on gait with scanning. Pt ambulated 300ft with SBA while scanning L/R and up/down and performing dynamic reaching and hip hinging to retrieve cones. Pt educated on turning whole body in order to adhere to neck precautions.  Patient states her ambulation is back to normal, but she still c/o R baseline hip pain and bernabe hip stiffness, which she attributes to inactivity. Patient had 2 episodes of bumping obstacles on the R. Patient transferred on<>off toilet and performed pericare with supervision. Ended session in chair with all needs met. Recommending  PT for discharge. Current Level of Function Impacting Discharge (mobility/balance): Supervision for transfers and SBA for gait    Other factors to consider for discharge: Lives alone, independent baseline, cervical collar         PLAN :  Patient continues to benefit from skilled intervention to address the above impairments. Continue treatment per established plan of care. to address goals. Recommendation for discharge: (in order for the patient to meet his/her long term goals)  Physical therapy at least 2 days/week in the home     This discharge recommendation:  Has not yet been discussed the attending provider and/or case management    IF patient discharges home will need the following DME: none       SUBJECTIVE:   Patient stated \"My face feels back to normal now.     OBJECTIVE DATA SUMMARY:   Critical Behavior:  Neurologic State: Alert, Eyes open spontaneously  Orientation Level: Oriented X4  Cognition: Appropriate decision making, Appropriate safety awareness, Follows commands  Safety/Judgement: Awareness of environment, Fall prevention  Functional Mobility Training:  Transfers:  Sit to Stand: Supervision  Stand to Sit: Supervision  Balance:  Sitting: Intact  Standing: Impaired  Standing - Static: Good; Unsupported  Standing - Dynamic : Good; Unsupported  Ambulation/Gait Training:  Distance (ft): 300 Feet (ft)  Assistive Device: Gait belt  Ambulation - Level of Assistance: Stand-by assistance  Gait Abnormalities: Altered arm swing;Decreased step clearance  Step Length: Left shortened;Right shortened    Activity Tolerance:    WNL    After treatment patient left in no apparent distress:   Sitting in chair and Call bell within reach    COMMUNICATION/COLLABORATION:   The patients plan of care was discussed with: Occupational therapist and Registered nurse. Yuly Clements, SPT   Time Calculation: 26 mins    Regarding student involvement in patient care:  A student participated in this treatment session. Per CMS Medicare statements and APTA guidelines I certify that the following was true:  1. I was present and directly observed the entire session. 2. I made all skilled judgments and clinical decisions regarding care. 3. I am the practitioner responsible for assessment, treatment, and documentation.

## 2021-06-23 NOTE — PROGRESS NOTES
Neurology Progress Note     NAME: Kristel Medina   :  1955   MRN:  [id number]   DATE:  2021    Assessment:     Principal Problem:    Vertebral artery occlusion, right (2021)      Pt is a 58yo RH female presenting 21 with imbalance and sensory changes describing herself as feeling hot on the left side compared to the right, found to have a right vertebral thrombus that was partially occlusive and was started on heparin drip by NIS.  21 she had sensory changes in the right face and left arm with right leg weakness, which was new, repeat CTH without hemorrhage.  CTA of the head and neck stable.  MRI of the brain with right cerebellar CVA x 2, right pontine CVA, and R midbrain CVA.  Repeat CTH 21 was stable. Echocardiogram unremarkable, was done without bubble study, .4 - statin started, hemoglobin A1c - 5.8 in prediabetes range, and hypercoagulable panel neg so far except slightly elevated Factor VIII, which could be due to acute stroke.  Exam is non-focal, no weakness appreciated, no sensory changes on face reported. Plan:   -NIS recommend switch to Lovenox and anticipate starting [address] prior to d/c, now they are considering d/c on Lovenox  -Bubble study pending  -CTA H/N now planned for 21  -[employer] Men to d/c C-collar per NIS yesterday, now recommending to continue it. Avoid use of massage collar in the future.   -Will sign off, please call if needed. Subjective:    Pt reports she is back to baseline. No new complaints.      Objective:   Chart reviewed since last seen    Current Facility-Administered Medications   Medication Dose Route Frequency    enoxaparin (LOVENOX) injection 100 mg  1 mg/kg SubCUTAneous Q12H    thyroid (Pork) (ARMOUR) tablet 90 mg  90 mg Oral DAILY    atorvastatin (LIPITOR) tablet 40 mg  40 mg Oral QHS    lactated Ringers infusion  75 mL/hr IntraVENous CONTINUOUS    sodium chloride (NS) flush 5-40 mL  5-40 mL IntraVENous Q8H    sodium chloride (NS) flush 5-40 mL  5-40 mL IntraVENous PRN    acetaminophen (TYLENOL) tablet 650 mg  650 mg Oral Q6H PRN    Or    acetaminophen (TYLENOL) suppository 650 mg  650 mg Rectal Q6H PRN    polyethylene glycol (MIRALAX) packet 17 g  17 g Oral DAILY PRN    ondansetron (ZOFRAN ODT) tablet 4 mg  4 mg Oral Q8H PRN    Or    ondansetron (ZOFRAN) injection 4 mg  4 mg IntraVENous Q6H PRN       Visit Vitals  BP (!) 140/67 (BP 1 Location: Right arm, BP Patient Position: At rest)   Pulse 68   Temp 98.4 °F (36.9 °C)   Resp 16   Ht 5' 7\" (1.702 m)   Wt 226 lb 11.2 oz (102.8 kg)   LMP 2013   SpO2 96%   Breastfeeding No   BMI 35.51 kg/m²     Temp (24hrs), Av.1 °F (36.7 °C), Min:97.4 °F (36.3 °C), Max:98.6 °F (37 °C)       07 -  1900  In: 929.8 [I.V.:929.8]  Out: -    190 -  0700  In: 2948 [I.V.:2792]  Out: 600 [Urine:600]      Physical Exam:  General: Well developed well nourished patient in no apparent distress. Cardiac: Regular rate and rhythm with no murmurs. Extremities: 2+ Radial pulses, no cyanosis or edema    Neurological Exam:  Mental Status: Oriented to time, place and person. Speech and language intact. Attention and fund of knowledge appropriate. Normal recent and remote memory. Cranial Nerves:   VFF, PERRL, EOMI, no nystagmus, no ptosis. Facial sensation is normal. Facial movement is symmetric. Palate is midline. Tongue is midline. Hearing is intact bilaterally. Motor:  5/5 strength in upper and lower proximal and distal muscles. Normal bulk and tone. No PD. No tremors   Reflexes:   Deep tendon reflexes 2+ and symmetric. Toes downgoing. Sensory:   Intact to LT and PP   Gait:  Steady routine gait, able to tandem walk.    Cerebellar:  Intact FTN and HTS, JANICE intact         Lab Review   Recent Results (from the past 24 hour(s))   PTT Collection Time: 06/23/21  2:40 AM   Result Value Ref Range    aPTT 29.3 22.1 - 31.0 sec    aPTT, therapeutic range     58.0 - 77.0 SECS   MAGNESIUM    Collection Time: 06/23/21  2:40 AM   Result Value Ref Range    Magnesium 1.8 1.6 - 2.4 mg/dL   METABOLIC PANEL, BASIC    Collection Time: 06/23/21  2:40 AM   Result Value Ref Range    Sodium 139 136 - 145 mmol/L    Potassium 4.0 3.5 - 5.1 mmol/L    Chloride 108 97 - 108 mmol/L    CO2 25 21 - 32 mmol/L    Anion gap 6 5 - 15 mmol/L    Glucose 109 (H) 65 - 100 mg/dL    BUN 13 6 - 20 MG/DL    Creatinine 0.86 0.55 - 1.02 MG/DL    BUN/Creatinine ratio 15 12 - 20      GFR est AA >60 >60 ml/min/1.73m2    GFR est non-AA >60 >60 ml/min/1.73m2    Calcium 9.6 8.5 - 10.1 MG/DL   PHOSPHORUS    Collection Time: 06/23/21  2:40 AM   Result Value Ref Range    Phosphorus 3.7 2.6 - 4.7 MG/DL   CBC WITH AUTOMATED DIFF    Collection Time: 06/23/21  2:40 AM   Result Value Ref Range    WBC 6.9 3.6 - 11.0 K/uL    RBC 4.63 3.80 - 5.20 M/uL    HGB 14.0 11.5 - 16.0 g/dL    HCT 43.1 35.0 - 47.0 %    MCV 93.1 80.0 - 99.0 FL    MCH 30.2 26.0 - 34.0 PG    MCHC 32.5 30.0 - 36.5 g/dL    RDW 13.0 11.5 - 14.5 %    PLATELET 051 855 - 855 K/uL    MPV 11.0 8.9 - 12.9 FL    NRBC 0.0 0  WBC    ABSOLUTE NRBC 0.00 0.00 - 0.01 K/uL    NEUTROPHILS 54 32 - 75 %    LYMPHOCYTES 33 12 - 49 %    MONOCYTES 10 5 - 13 %    EOSINOPHILS 2 0 - 7 %    BASOPHILS 1 0 - 1 %    IMMATURE GRANULOCYTES 0 0.0 - 0.5 %    ABS. NEUTROPHILS 3.7 1.8 - 8.0 K/UL    ABS. LYMPHOCYTES 2.3 0.8 - 3.5 K/UL    ABS. MONOCYTES 0.7 0.0 - 1.0 K/UL    ABS. EOSINOPHILS 0.2 0.0 - 0.4 K/UL    ABS. BASOPHILS 0.1 0.0 - 0.1 K/UL    ABS. IMM. GRANS. 0.0 0.00 - 0.04 K/UL    DF AUTOMATED         Additional comments:  I have reviewed the patient's new clinical lab test results. I have personally reviewed the patient's radiographs.   MRI   MRI Results (most recent):  Results from [place] encounter on 06/20/21    MRI BRAIN WO CONT    Narrative  INDICATION:   assess for CVA, right vertebral thrombus    EXAMINATION:  MRI BRAIN WO CONTRAST    COMPARISON:  CTA earlier today    TECHNIQUE:  Multiplanar multisequence acquisition without contrast of the brain. FINDINGS:    Ventricles:  Midline, no hydrocephalus. Brain Parenchyma/Brainstem:  Normal for age. There are several punctate foci of  subtle diffusion signal abnormality, involving the posterior and superior right  cerebellum, right lateral medulla, and right central cesar. Intracranial Hemorrhage:  None. Basal Cisterns:  Normal.  Flow Voids:  Normal.  Additional Comments:  N/A. Impression  Few punctate foci of possible acute infarction in the right cerebellum, midbrain  and cesar as described. Correlate with clinical findings. Care Plan discussed with:  Patient x   Family    RN    Care Manager    Consultant/Specialist:       Signed: Ellie Phipps MD

## 2021-06-23 NOTE — PROGRESS NOTES
Problem: Breathing Pattern - Ineffective  Goal: *Use of effective breathing techniques  Outcome: Progressing Towards Goal  Goal: *PALLIATIVE CARE:  Alleviation of Dyspnea  Outcome: Progressing Towards Goal     Problem: Patient Education: Go to Patient Education Activity  Goal: Patient/Family Education  Outcome: Progressing Towards Goal     Problem: Patient Education: Go to Patient Education Activity  Goal: Patient/Family Education  Outcome: Progressing Towards Goal     Problem: Patient Education: Go to Patient Education Activity  Goal: Patient/Family Education  Outcome: Progressing Towards Goal     Problem: Falls - Risk of  Goal: *Absence of Falls  Description: Document Lissa Rosario Fall Risk and appropriate interventions in the flowsheet.   Outcome: Progressing Towards Goal  Note: Fall Risk Interventions:  Mobility Interventions: Patient to call before getting OOB         Medication Interventions: Patient to call before getting OOB    Elimination Interventions: Call light in reach              Problem: Patient Education: Go to Patient Education Activity  Goal: Patient/Family Education  Outcome: Progressing Towards Goal     Problem: Discharge Planning  Goal: *Discharge to safe environment  Outcome: Progressing Towards Goal

## 2021-06-23 NOTE — PROGRESS NOTES
[facility] Adult  Hospitalist Group                                                                                          Hospitalist Progress Note  Rowena Chen MD  Answering service: [phone] from in house phone        Date of Service:  2021  NAME:  Leida Benson  :  1955  MRN:  [id number]      Admission Summary:   Leida Benson is a 72-year-old female with a PMH only of hypothyroidism, arthritis and endometrial cancer who was transferred to [facility] from the ED at [facility] for treatment of a nonocclusive thrombus of the right vertebral artery. Admitted to ICU and started on heparin gtt, and transitioned to q2hr neurochecks, and moved out of the ICU    Interval history / Subjective:   Doing well. Denies any neck pain. No weakness or numbness. Plan for repeat CTA tomorrow. Transitioned to lovenox yesterday. Assessment & Plan:     Non-occlusive thrombus R vertebral artery - likely 2/2 osteophyte compression of artery   Acute cerebellum CVA  - Continue therapeutic lovenox, ?transition to NOAC on DC  - Neuro interventional following   - Neurology following  - Continue statin  - PT/OT --> HHPT  - Plan for repeat CTA , then potential DC home if stable. - May need future osteophyte surgery, however would have to be able to pause [facility] for a few days. Likely once stable, as outpatient. Hypothyroid - continue home medications. Code status: Full  DVT prophylaxis: SCD    Care Plan discussed with: Patient/Family  Anticipated Disposition: Home w/Family  Anticipated Discharge: Less than 24 hours, after CTA tomorrow if stable. Hospital Problems  Date Reviewed: 2021        Codes Class Noted POA    * (Principal) Vertebral artery occlusion, right ICD-10-CM: I65.01  ICD-9-CM: 433.20  2021 Unknown                Review of Systems:   A comprehensive review of systems was negative except for that written in the HPI.        Vital Signs:    Last 24hrs VS reviewed since prior progress note. Most recent are:  Visit Vitals  BP (!) 140/67 (BP 1 Location: Right arm, BP Patient Position: At rest)   Pulse 68   Temp 98.4 °F (36.9 °C)   Resp 16   Ht 5' 7\" (1.702 m)   Wt 102.8 kg (226 lb 11.2 oz)   SpO2 96%   Breastfeeding No   BMI 35.51 kg/m²         Intake/Output Summary (Last 24 hours) at 6/23/2021 1537  Last data filed at 6/23/2021 0400  Gross per 24 hour   Intake 1259.6 ml   Output    Net 1259.6 ml        Physical Examination:     I had a face to face encounter with this patient and independently examined them on 6/23/2021 as outlined below:          Constitutional:  No acute distress, cooperative, pleasant    ENT:  Oral mucosa moist, oropharynx benign. Soft collar in place. Resp:  CTA bilaterally. No wheezing/rhonchi/rales. No accessory muscle use   CV:  Regular rhythm, normal rate, no murmurs, gallops, rubs    GI:  Soft, non distended, non tender. normoactive bowel sounds, no hepatosplenomegaly     Musculoskeletal:  No edema, warm, 2+ pulses throughout    Neurologic:  Moves all extremities.   AAOx3, CN II-XII reviewed            Data Review:    Review and/or order of clinical lab test  Review and/or order of tests in the radiology section of CPT  Review and/or order of tests in the medicine section of CPT      Labs:     Recent Labs     06/23/21  0240 06/22/21  0640   WBC 6.9 6.1   HGB 14.0 13.7   HCT 43.1 42.7    213     Recent Labs     06/23/21  0240 06/22/21  0640 06/21/21  0516    141 138   K 4.0 4.2 3.6    109* 109*   CO2 25 25 24   BUN 13 12 14   CREA 0.86 0.76 0.89   * 96 137*   CA 9.6 9.2 8.7   MG 1.8 2.0 1.8   PHOS 3.7 3.4 3.0     Recent Labs     06/20/21  2155   ALT 34   AP 54   TBILI 0.4   TP 7.3   ALB 3.9   GLOB 3.4     Recent Labs     06/23/21  0240 06/22/21  1050 06/22/21  0206 06/21/21  0516 06/20/21  1035   INR  --   --   --   --  1.1   PTP  --   --   --   --  10.5   APTT 29.3 62.8* 93.7*   < > 23.7    < > = values in this interval not displayed. No results for input(s): FE, TIBC, PSAT, FERR in the last 72 hours. No results found for: FOL, RBCF   No results for input(s): PH, PCO2, PO2 in the last 72 hours. No results for input(s): CPK, CKNDX, TROIQ in the last 72 hours.     No lab exists for component: CPKMB  Lab Results   Component Value Date/Time    Cholesterol, total 185 06/21/2021 05:16 AM    HDL Cholesterol 38 06/21/2021 05:16 AM    LDL, calculated 108.4 (H) 06/21/2021 05:16 AM    Triglyceride 193 (H) 06/21/2021 05:16 AM    CHOL/HDL Ratio 4.9 06/21/2021 05:16 AM     Lab Results   Component Value Date/Time    Glucose (POC) 123 (H) 06/21/2021 11:17 AM    Glucose (POC) 89 06/20/2021 09:49 PM     Lab Results   Component Value Date/Time    Color YELLOW/STRAW 06/21/2021 12:00 AM    Appearance CLEAR 06/21/2021 12:00 AM    Specific gravity <1.005 06/21/2021 12:00 AM    pH (UA) 6.5 06/21/2021 12:00 AM    Protein Negative 06/21/2021 12:00 AM    Glucose Negative 06/21/2021 12:00 AM    Ketone Negative 06/21/2021 12:00 AM    Bilirubin Negative 06/21/2021 12:00 AM    Urobilinogen 0.2 06/21/2021 12:00 AM    Nitrites Negative 06/21/2021 12:00 AM    Leukocyte Esterase Negative 06/21/2021 12:00 AM    Epithelial cells FEW 06/21/2021 12:00 AM    Bacteria Negative 06/21/2021 12:00 AM    WBC 0-4 06/21/2021 12:00 AM    RBC 0-5 06/21/2021 12:00 AM         Medications Reviewed:     Current Facility-Administered Medications   Medication Dose Route Frequency    enoxaparin (LOVENOX) injection 100 mg  1 mg/kg SubCUTAneous Q12H    thyroid (Pork) (ARMOUR) tablet 90 mg  90 mg Oral DAILY    atorvastatin (LIPITOR) tablet 40 mg  40 mg Oral QHS    lactated Ringers infusion  75 mL/hr IntraVENous CONTINUOUS    sodium chloride (NS) flush 5-40 mL  5-40 mL IntraVENous Q8H    sodium chloride (NS) flush 5-40 mL  5-40 mL IntraVENous PRN    acetaminophen (TYLENOL) tablet 650 mg  650 mg Oral Q6H PRN    Or    acetaminophen (TYLENOL) suppository 650 mg 650 mg Rectal Q6H PRN    polyethylene glycol (MIRALAX) packet 17 g  17 g Oral DAILY PRN    ondansetron (ZOFRAN ODT) tablet 4 mg  4 mg Oral Q8H PRN    Or    ondansetron (ZOFRAN) injection 4 mg  4 mg IntraVENous Q6H PRN     ______________________________________________________________________  EXPECTED LENGTH OF STAY: 2d 0h  ACTUAL LENGTH OF STAY:          3                 Dick Finney MD

## 2021-06-23 NOTE — PROGRESS NOTES
Transition of Care Plan   RUR- 12% Low Risk   DISPOSITION: The disposition plan is pending recommendation and medical progression.  F/U with PCP/Specialist     Transport: [facility]/family     It is reported that patient will have an CTA completed on Thursday and will need an EEG completed as well. CM will continue to follow, provide support and assist with ENRIQUE needs as they arise.     Isreal Carrasquillo

## 2021-06-23 NOTE — PROGRESS NOTES
Problem: Breathing Pattern - Ineffective  Goal: *Use of effective breathing techniques  Outcome: Progressing Towards Goal     Problem: Falls - Risk of  Goal: *Absence of Falls  Description: Document Barrie Fall Risk and appropriate interventions in the flowsheet.   Outcome: Progressing Towards Goal  Note: Fall Risk Interventions:  Mobility Interventions: Patient to call before getting OOB, PT Consult for mobility concerns, PT Consult for assist device competence    Medication Interventions: Patient to call before getting OOB, Teach patient to arise slowly    Elimination Interventions: Call light in reach, Elevated toilet seat, Patient to call for help with toileting needs, Stay With Me (per policy), Toilet paper/wipes in reach, Toileting schedule/hourly rounds

## 2021-06-23 NOTE — PROGRESS NOTES
Bedside and Verbal shift change report given to [facility] (oncoming nurse) by Hair Ceballos (offgoing nurse). Report included the following information SBAR, Kardex, Intake/Output, MAR, Recent Results, Cardiac Rhythm NSR and Dual Neuro Assessment.

## 2021-06-24 ENCOUNTER — APPOINTMENT (OUTPATIENT)
Dept: CT IMAGING | Age: 66
DRG: 064 | End: 2021-06-24
Attending: RADIOLOGY
Payer: COMMERCIAL

## 2021-06-24 ENCOUNTER — DOCUMENTATION ONLY (OUTPATIENT)
Dept: NEUROSURGERY | Age: 66
End: 2021-06-24

## 2021-06-24 ENCOUNTER — APPOINTMENT (OUTPATIENT)
Dept: NON INVASIVE DIAGNOSTICS | Age: 66
DRG: 064 | End: 2021-06-24
Attending: PSYCHIATRY & NEUROLOGY
Payer: COMMERCIAL

## 2021-06-24 VITALS
SYSTOLIC BLOOD PRESSURE: 153 MMHG | RESPIRATION RATE: 20 BRPM | TEMPERATURE: 98 F | WEIGHT: 226.63 LBS | HEIGHT: 67 IN | DIASTOLIC BLOOD PRESSURE: 88 MMHG | BODY MASS INDEX: 35.57 KG/M2 | OXYGEN SATURATION: 99 % | HEART RATE: 75 BPM

## 2021-06-24 DIAGNOSIS — I77.74 VERTEBRAL ARTERY DISSECTION (HCC): Primary | ICD-10-CM

## 2021-06-24 LAB
ANION GAP SERPL CALC-SCNC: 9 MMOL/L (ref 5–15)
BASOPHILS # BLD: 0.1 K/UL (ref 0–0.1)
BASOPHILS NFR BLD: 1 % (ref 0–1)
BUN SERPL-MCNC: 12 MG/DL (ref 6–20)
BUN/CREAT SERPL: 12 (ref 12–20)
CALCIUM SERPL-MCNC: 9.3 MG/DL (ref 8.5–10.1)
CHLORIDE SERPL-SCNC: 106 MMOL/L (ref 97–108)
CO2 SERPL-SCNC: 24 MMOL/L (ref 21–32)
COMMENT, 511217: NORMAL
CREAT SERPL-MCNC: 0.97 MG/DL (ref 0.55–1.02)
DIFFERENTIAL METHOD BLD: ABNORMAL
EOSINOPHIL # BLD: 0.2 K/UL (ref 0–0.4)
EOSINOPHIL NFR BLD: 2 % (ref 0–7)
ERYTHROCYTE [DISTWIDTH] IN BLOOD BY AUTOMATED COUNT: 13.1 % (ref 11.5–14.5)
F5 GENE MUT ANL BLD/T: NORMAL
GLUCOSE SERPL-MCNC: 101 MG/DL (ref 65–100)
HCT VFR BLD AUTO: 44.5 % (ref 35–47)
HGB BLD-MCNC: 14.7 G/DL (ref 11.5–16)
IMM GRANULOCYTES # BLD AUTO: 0.1 K/UL (ref 0–0.04)
IMM GRANULOCYTES NFR BLD AUTO: 1 % (ref 0–0.5)
LYMPHOCYTES # BLD: 2.8 K/UL (ref 0.8–3.5)
LYMPHOCYTES NFR BLD: 36 % (ref 12–49)
MAGNESIUM SERPL-MCNC: 1.9 MG/DL (ref 1.6–2.4)
MCH RBC QN AUTO: 30.6 PG (ref 26–34)
MCHC RBC AUTO-ENTMCNC: 33 G/DL (ref 30–36.5)
MCV RBC AUTO: 92.5 FL (ref 80–99)
MONOCYTES # BLD: 0.8 K/UL (ref 0–1)
MONOCYTES NFR BLD: 10 % (ref 5–13)
NEUTS SEG # BLD: 4.1 K/UL (ref 1.8–8)
NEUTS SEG NFR BLD: 50 % (ref 32–75)
NRBC # BLD: 0 K/UL (ref 0–0.01)
NRBC BLD-RTO: 0 PER 100 WBC
PHOSPHATE SERPL-MCNC: 3.9 MG/DL (ref 2.6–4.7)
PLATELET # BLD AUTO: 234 K/UL (ref 150–400)
PMV BLD AUTO: 10.8 FL (ref 8.9–12.9)
POTASSIUM SERPL-SCNC: 4 MMOL/L (ref 3.5–5.1)
RBC # BLD AUTO: 4.81 M/UL (ref 3.8–5.2)
SODIUM SERPL-SCNC: 139 MMOL/L (ref 136–145)
T4 FREE SERPL-MCNC: 0.6 NG/DL (ref 0.8–1.5)
WBC # BLD AUTO: 8 K/UL (ref 3.6–11)

## 2021-06-24 PROCEDURE — 74011250637 HC RX REV CODE- 250/637: Performed by: STUDENT IN AN ORGANIZED HEALTH CARE EDUCATION/TRAINING PROGRAM

## 2021-06-24 PROCEDURE — 85025 COMPLETE CBC W/AUTO DIFF WBC: CPT

## 2021-06-24 PROCEDURE — 74011000636 HC RX REV CODE- 636: Performed by: RADIOLOGY

## 2021-06-24 PROCEDURE — 83735 ASSAY OF MAGNESIUM: CPT

## 2021-06-24 PROCEDURE — 74011250636 HC RX REV CODE- 250/636: Performed by: NURSE PRACTITIONER

## 2021-06-24 PROCEDURE — 36415 COLL VENOUS BLD VENIPUNCTURE: CPT

## 2021-06-24 PROCEDURE — 93308 TTE F-UP OR LMTD: CPT

## 2021-06-24 PROCEDURE — 80048 BASIC METABOLIC PNL TOTAL CA: CPT

## 2021-06-24 PROCEDURE — 84439 ASSAY OF FREE THYROXINE: CPT

## 2021-06-24 PROCEDURE — 84100 ASSAY OF PHOSPHORUS: CPT

## 2021-06-24 PROCEDURE — 74011250637 HC RX REV CODE- 250/637: Performed by: NURSE PRACTITIONER

## 2021-06-24 PROCEDURE — 70498 CT ANGIOGRAPHY NECK: CPT

## 2021-06-24 RX ORDER — ATORVASTATIN CALCIUM 40 MG/1
40 TABLET, FILM COATED ORAL
Qty: 30 TABLET | Refills: 0 | Status: SHIPPED | OUTPATIENT
Start: 2021-06-24 | End: 2021-07-24

## 2021-06-24 RX ADMIN — Medication 10 ML: at 14:11

## 2021-06-24 RX ADMIN — ENOXAPARIN SODIUM 100 MG: 100 INJECTION SUBCUTANEOUS at 06:02

## 2021-06-24 RX ADMIN — LEVOTHYROXINE, LIOTHYRONINE 90 MG: 38; 9 TABLET ORAL at 11:13

## 2021-06-24 RX ADMIN — SODIUM CHLORIDE, POTASSIUM CHLORIDE, SODIUM LACTATE AND CALCIUM CHLORIDE 75 ML/HR: 600; 310; 30; 20 INJECTION, SOLUTION INTRAVENOUS at 14:06

## 2021-06-24 RX ADMIN — APIXABAN 5 MG: 5 TABLET, FILM COATED ORAL at 17:51

## 2021-06-24 RX ADMIN — IOPAMIDOL 100 ML: 755 INJECTION, SOLUTION INTRAVENOUS at 02:37

## 2021-06-24 NOTE — PROGRESS NOTES
Neurointerventional Surgery Progress Note    Patient: Flower Reynaga MRN: [id number]  SSN: [id number]    YOB: 1955  Age: 61 y.o. Sex: female      Admit Date: 2021    LOS: 4 days     Subjective:     No headache or neurological complaints today. No events overnight. Right V2 sensory deficits resolved. Tolerating collar. Objective:     Patient Vitals for the past 24 hrs:   Temp Pulse Resp BP SpO2   21 1006 97.6 °F (36.4 °C) 84 16 130/84 94 %   21 0600 97.5 °F (36.4 °C) 77 21 134/86 94 %   21 0153 97.6 °F (36.4 °C) 71 21 128/82 98 %   21 2159 98 °F (36.7 °C) 86 20 133/76 98 %   21 1808 98.3 °F (36.8 °C) 77 22 123/88 94 %   21 1408 98.4 °F (36.9 °C) 68 16 (!) 140/67 96 %        Intake and Output:  Current Shift: No intake/output data recorded. Last three shifts:  1901 -  0700  In: 1829.8 [I.V.:1829.8]  Out: -     Neurological Exam:   AF VSS; NAD. A&O x 4, fluent speech and appropriate affect. CNII-XII grossly intact. EOMI. PERRLA. Normal strength and sensation throughout. No pronator drift. NIHSS:      1a-LOC: 0    1b-Month/Age: 0    1c-Open/Close Hand: 0    2-Best Gaze: 0    3-Visual Fields: 0    4-Facial Palsy: 0    5a-Left Arm: 0    5b-Right Arm: 0    6a-Left Le    6b-Right Le    7-Limb Ataxia: 0    8-Sensory: 0    9-Best Language: 0    10-Dysarthria: 0    11-Extinction/Inattention: 0  TOTAL SCORE: 0    Lab/Data Review: All lab results for the last 24 hours reviewed. Imaging Reviewed:   CTA reviewed. Minimal residual thrombus. Marked improvement. Clear impingement of right vertebral artery by anterior facet joint at C3-4. Assessment and Plan:     Cerebellar and brainstem strokes resulting from right cervical facet joint impingement (probable dissection). In soft collar as tolerated. Eliquis first dose today.   Should remain on Eliquis until definitive neurosurgical management, maybe ACDF to immobilize the segment. Case discussed with Dr. Davey Longo who has agreed to see her as an outpatient. Follow up outpatient in my office after neurosurgery consultation. Stroke education provided by me. OK for discharge.           Signed By: Maria Ines Kang MD     June 24, 2021

## 2021-06-24 NOTE — DISCHARGE INSTRUCTIONS
Discharge Instructions       PATIENT ID: Flower Reynaga  MRN: [id number]   YOB: 1955    DATE OF ADMISSION: 6/20/2021  9:44 PM    DATE OF DISCHARGE: 6/24/2021    PRIMARY CARE PROVIDER: Keyla Leija MD     ATTENDING PHYSICIAN: Ema Albarado MD  DISCHARGING PROVIDER: Charito Andersen MD    To contact this individual call [phone] and ask the  to page. If unavailable ask to be transferred the Adult Hospitalist Department. DISCHARGE DIAGNOSES and CARE RECOMMENDATIONS:   Acute stroke in the right cerebellum, midbrain cesar associated with a non-occlusive thrombus right vertebral artery. This is felt to be secondary to osteophyte compression of artery. .  Continue taking the blood thinner, apixaban 5 mg twice daily. 1. Please call and schedule follow up with neurosurgery to discuss removal of the osteophyte that is impinging upon your vertebral artery likely causing thrombosis. 2. Please call to schedule follow up with Dr. Araseli Lee   3.  Please get a repeat CTA neck in one month (already ordered) they should call to schedule it in the next week or two         DIET: Regular Diet      ACTIVITY: Activity as tolerated    WOUND CARE: none    PENDING TEST RESULTS:   At the time of discharge the following test results are still pending: none    FOLLOW UP APPOINTMENTS:   Follow-up Information     Follow up With Specialties Details Why Contact Info    Keyla Leija MD Internal Medicine   Thuy Brooks MD Neurosurgery  Call office to schedule appointment [address]      Bebo Miller MD Endovascular Surgical Neuroradiology In 1 month Please call to schedule appointment after your CTA head/neck obtained  [address] [place] [address] [place] [address]  [phone]             YOU WERE SEEN BY THE FOLLOWING CONSULTATIONS:   IP CONSULT TO NEUROLOGY  IP CONSULT TO NEUROINTERVENTIONAL SURGERY  IP CONSULT TO INTENSIVIST    YOU HAD THE FOLLOWING PROCEDURES/SURGERIES  :   * No surgery found *              DISCHARGE MEDICATIONS:    Continue taking Eliquis 5 mg BID as instructed   See Medication Reconciliation Form    · It is important that you take the medication exactly as they are prescribed. · Keep your medication in the bottles provided by the pharmacist and keep a list of the medication names, dosages, and times to be taken in your wallet. · Do not take other medications without consulting your doctor. NOTIFY YOUR PHYSICIAN FOR ANY OF THE FOLLOWING:   Fever over 101 degrees for 24 hours. Chest pain, shortness of breath, fever, chills, nausea, vomiting, diarrhea, change in mentation, falling, weakness, bleeding. Severe pain or pain not relieved by medications. Or, any other signs or symptoms that you may have questions about. Services you need on discharge  -     HOME HEALTH NURSE: [id number]. PHYSICAL THERAPY x   OCCUPATIONAL THERAPY x   HOSPITAL TO HOME VISIT     [facility]          Signed:    Tom Hernandez MD  6/24/2021  9:35 AM

## 2021-06-24 NOTE — PROGRESS NOTES
Transition of Care Plan   RUR- 12% Low Risk    DISPOSITION: The disposition plan is home with family assistance/home with home health -[facility]  [phone] - patient accepted.  F/U with PCP/Specialist     Transport: Family    At 11:00am - CM met with patient at bedside to check in and follow up regarding aftercare plans. Patient has been recommended for HHPT. CM messaged attending physician to confirm recommendation, it was confirmed. Order was created to submit to choices via [employer]. CM provided choice. The Plan for Transition of Care is related to the following treatment goals:HHPT    The Patient was provided with a choice of provider and agrees   with the discharge plan. [x] Yes [] No    Freedom of choice list was provided with basic dialogue that supports the patient's individualized plan of care/goals, treatment preferences and shares the quality data associated with the providers. [x] Yes [] No    It is reported that patient will discharge home later today. Patient reports that she will transition to her daughters house for a couple of weeks. CM submitted referral to: [facility] for review -unable to accept at this time  [facility] - unable to accept patient  At [address] - unable to accept patient      Home Health agencies unable to accept patient because they are not in network. Patient is in network with [employer] [facility]. CM submitted to following agencies for review:    [id number] - Accepted patient  [address] -    [facility] has been updated. CM informed patient and patients attending physician. CM will continue to follow, provide support and assist with ENRIQUE needs as they arise.     David Dooley ALEX Limon,CRM

## 2021-06-24 NOTE — PROGRESS NOTES
Echo report is pending. Discussed with Dr. Bry Arshad, since patient is on Eliquis and the echo is unlikely to change the treatment plan at this time,we decided to let her go. She will follow-up the echo report as outpatient.

## 2021-06-24 NOTE — PROGRESS NOTES
Bedside shift change report given to Germain Patel RN (oncoming nurse) by Mitra Yadav RN (offgoing nurse). Report included the following information SBAR, Kardex, Intake/Output, MAR, Recent Results, Cardiac Rhythm NSR/SB and Dual Neuro Assessment.

## 2021-06-25 LAB
ECHO AV PEAK GRADIENT: 8.53 MMHG
ECHO AV PEAK VELOCITY: 146.01 CM/S
ECHO LA AREA 4C: 16.8 CM2
ECHO LA TO AORTIC ROOT RATIO: 1.33
ECHO LA VOL 4C: 37.56 ML (ref 22–52)
ECHO LA VOLUME INDEX A4C: 17.63 ML/M2 (ref 16–28)
ECHO LV E' LATERAL VELOCITY: 8.41 CM/S
ECHO LV E' SEPTAL VELOCITY: 7.34 CM/S
ECHO LV INTERNAL DIMENSION DIASTOLIC: 4.17 CM (ref 3.9–5.3)
ECHO LV INTERNAL DIMENSION SYSTOLIC: 2.64 CM
ECHO LV IVSD: 1.11 CM (ref 0.6–0.9)
ECHO LV MASS 2D: 119.1 G (ref 67–162)
ECHO LV MASS INDEX 2D: 55.9 G/M2 (ref 43–95)
ECHO LV POSTERIOR WALL DIASTOLIC: 0.71 CM (ref 0.6–0.9)
ECHO LVOT PEAK GRADIENT: 6.12 MMHG
ECHO LVOT PEAK VELOCITY: 123.65 CM/S
ECHO MV A VELOCITY: 85.98 CM/S
ECHO MV E VELOCITY: 73.01 CM/S
ECHO MV E/A RATIO: 0.85
ECHO MV E/E' LATERAL: 8.68
ECHO MV E/E' RATIO (AVERAGED): 9.31
ECHO MV E/E' SEPTAL: 9.95
ECHO RV INTERNAL DIMENSION: 2.49 CM
ECHO RV TAPSE: 2.21 CM (ref 1.5–2)

## 2021-06-27 NOTE — DISCHARGE SUMMARY
Discharge Summary       PATIENT ID: Mariusz Jeffrey  MRN: [id number]   YOB: 1955    DATE OF ADMISSION: 6/20/2021  9:44 PM    DATE OF DISCHARGE: 06/24/2021   PRIMARY CARE PROVIDER: Brad Quiles MD     ATTENDING PHYSICIAN: Tressa Ortega MD    DISCHARGING PROVIDER: Tressa Ortega MD    To contact this individual call [phone] and ask the  to page. If unavailable ask to be transferred the Adult Hospitalist Department. CONSULTATIONS: IP CONSULT TO NEUROLOGY  IP CONSULT TO NEUROINTERVENTIONAL SURGERY    PROCEDURES/SURGERIES: * No surgery found *    ADMITTING [address] [place] [address] COURSE:   Mariusz Jeffrey is a 26-year-old female with a PMH only of hypothyroidism, arthritis and endometrial cancer who initially presented to the [facility] emergency room for dizziness and ataxia. Was transferred to [facility] from the ED at [facility] for treatment of a nonocclusive thrombus of the right vertebral artery. She was initially admitted to ICU, placedon heparin gtt and observed with frequent neuro checks. She was evaluated by neuro interventional list, no indication for endovascular treatment. She remained stable and was transferred out of the ICU on 6/22. Non-occlusive thrombus R vertebral artery - likely 2/2 osteophyte compression/impingement of artery with probable dissection cerebellar and brainstem strokes resulting from right cervical facet joint impingement (probable dissection). In soft collar as tolerated. Eliquis first dose today. Should remain on Eliquis until definitive neurosurgical management, maybe ACDF to immobilize the segment. Case discussed with Dr. Laurie Lynn who has agreed to see her as an outpatient. I have discussed case with neuro interventional Dr Vannesa Abrams who had reviewed the repeat CTA of the head and neck and cleared for discharge on Eliquis.   Patient is made aware that she will need to follow with neurosurgery and neuro interventional as outpatient. Hypothyroid -uncontrolled, still hypothyroid with elevated TSH and low T4. Patient is on Temple Thyroid, states that levothyroxine was not effective for her? .  I have advised her to speak with her outpatient provider to either increase the Temple or switch to levothyroxine (Synthroid)          PENDING TEST RESULTS:   At the time of discharge the following test results are still pending: Echocardiogram with bubble study was completed but the report was pending at the time of discharge. The time of this note echocardiogram is reported, no indication of intracardiac shunting per report. FOLLOW UP APPOINTMENTS:    Follow-up Information     Follow up With Specialties Details Why Contact Info    Pieter Dance, MD Internal Medicine   [address] Laurent Bernice Obrien MD Neurosurgery  Call office to schedule appointment [address]      Rj Weller MD Endovascular Surgical Neuroradiology In 1 month Please call to schedule appointment after your CTA head/neck obtained  [address], [address] [place] [phone]             DIET: Regular Diet and Cardiac Diet    ACTIVITY: Activity as tolerated      EQUIPMENT needed: None      DISCHARGE MEDICATIONS:  Discharge Medication List as of 6/24/2021  6:31 PM      START taking these medications    Details   apixaban (ELIQUIS) 5 mg tablet Take 1 Tablet by mouth every twelve (12) hours every twelve (12) hours for 30 days. , Normal, Disp-60 Tablet, R-0      atorvastatin (LIPITOR) 40 mg tablet Take 1 Tablet by mouth nightly for 30 days. , Normal, Disp-30 Tablet, R-0         CONTINUE these medications which have NOT CHANGED    Details   thyroid, Pork, (ARMOUR) 90 mg tablet Take 90 mg by mouth daily. , Historical Med      OTHER Historical Med      cyanocobalamin (VITAMIN B-12) 1,000 mcg tablet Take 1,000 mcg by mouth daily. , Historical Med      Cholecalciferol, Vitamin D3, 5,000 unit Tab Take  by mouth., Historical Med      spironolactone (ALDACTONE) 50 mg tablet Take  by mouth daily. , Historical Med               NOTIFY YOUR PHYSICIAN FOR ANY OF THE FOLLOWING:   Fever over 101 degrees for 24 hours. Chest pain, shortness of breath, fever, chills, nausea, vomiting, diarrhea, change in mentation, falling, weakness, bleeding. Severe pain or pain not relieved by medications. Or, any other signs or symptoms that you may have questions about. DISPOSITION:   x Home With:   OT x PT x HH  RN       Long term SNF/Inpatient Rehab    Independent/assisted living    Hospice    Other:       PATIENT CONDITION AT DISCHARGE:     Functional status    Poor     Deconditioned     Independent      Cognition    x Lucid     Forgetful     Dementia      Catheters/lines (plus indication)    Monk     PICC     PEG    x None      Code status    x Full code     DNR      PHYSICAL EXAMINATION AT DISCHARGE:  General:          Alert, cooperative, no distress, appears stated age. HEENT:           Atraumatic, anicteric sclerae, pink conjunctivae                          No oral ulcers, mucosa moist, throat clear, dentition fair  Neck:               Supple, symmetrical  Lungs:             Clear to auscultation bilaterally. No Wheezing or Rhonchi. No rales. Chest wall:      No tenderness  No Accessory muscle use. Heart:              Regular  rhythm,  No  murmur   No edema  Abdomen:        Soft, non-tender. Not distended. Bowel sounds normal  Extremities:     No cyanosis. No clubbing,                            Skin turgor normal, Capillary refill normal  Skin:                Not pale. Not Jaundiced  No rashes   Psych:             Not anxious or agitated.   Neurologic:      Alert, moves all extremities, answers questions appropriately and responds to commands       CHRONIC MEDICAL DIAGNOSES:  Problem List as of 6/24/2021 Date Reviewed: 6/22/2021        Codes Class Noted - Resolved    * (Principal) Vertebral artery occlusion, right ICD-10-CM: I65.01  ICD-9-CM: 433.20  6/20/2021 - Present        Endometrial cancer ([facility] ICD-10-CM: C54.1  ICD-9-CM: 182.0  9/17/2013 - Present              Greater than 30 minutes were spent with the patient on counseling and coordination of care    Signed:    Randa Gottlieb MD  6/27/2021  7:25 PM

## 2021-07-23 ENCOUNTER — HOSPITAL ENCOUNTER (OUTPATIENT)
Dept: CT IMAGING | Age: 66
Discharge: HOME | End: 2021-07-23
Attending: NURSE PRACTITIONER
Payer: COMMERCIAL

## 2021-07-23 DIAGNOSIS — I77.74 VERTEBRAL ARTERY DISSECTION (HCC): ICD-10-CM

## 2021-07-23 PROCEDURE — 70498 CT ANGIOGRAPHY NECK: CPT

## 2021-07-23 PROCEDURE — 74011000636 HC RX REV CODE- 636: Performed by: RADIOLOGY

## 2021-07-23 RX ADMIN — IOPAMIDOL 100 ML: 755 INJECTION, SOLUTION INTRAVENOUS at 10:24

## 2021-07-27 ENCOUNTER — OFFICE VISIT (OUTPATIENT)
Dept: NEUROSURGERY | Age: 66
End: 2021-07-27
Payer: COMMERCIAL

## 2021-07-27 VITALS
WEIGHT: 222.2 LBS | BODY MASS INDEX: 34.88 KG/M2 | TEMPERATURE: 96.4 F | HEIGHT: 67 IN | SYSTOLIC BLOOD PRESSURE: 122 MMHG | HEART RATE: 73 BPM | OXYGEN SATURATION: 98 % | DIASTOLIC BLOOD PRESSURE: 80 MMHG

## 2021-07-27 DIAGNOSIS — I65.01 STENOSIS OF RIGHT VERTEBRAL ARTERY: ICD-10-CM

## 2021-07-27 DIAGNOSIS — I65.01 VERTEBRAL ARTERY OCCLUSION, RIGHT: Primary | ICD-10-CM

## 2021-07-27 DIAGNOSIS — I77.74 DISSECTION OF EXTRACRANIAL VERTEBRAL ARTERY (HCC): ICD-10-CM

## 2021-07-27 DIAGNOSIS — Z86.73 HISTORY OF CVA (CEREBROVASCULAR ACCIDENT): ICD-10-CM

## 2021-07-27 PROBLEM — I65.09 VERTEBRAL ARTERY STENOSIS: Status: ACTIVE | Noted: 2021-07-27

## 2021-07-27 PROCEDURE — 99214 OFFICE O/P EST MOD 30 MIN: CPT | Performed by: RADIOLOGY

## 2021-07-27 RX ORDER — APIXABAN 5 MG/1
5 TABLET, FILM COATED ORAL 2 TIMES DAILY
COMMUNITY
Start: 2021-07-26 | End: 2021-10-22 | Stop reason: DRUGHIGH

## 2021-07-27 RX ORDER — ATORVASTATIN CALCIUM 40 MG/1
40 TABLET, FILM COATED ORAL DAILY
COMMUNITY

## 2021-07-27 RX ORDER — ASPIRIN 81 MG/1
81 TABLET ORAL DAILY
Qty: 30 TABLET | Refills: 5 | Status: SHIPPED | OUTPATIENT
Start: 2021-07-27

## 2021-07-27 NOTE — PATIENT INSTRUCTIONS
The blood clot in your right vertebral artery has dissolved completely. Continue taking Eliquis 5 mg p.o. twice daily. Add 81 mg of aspirin p.o. daily. The clot that formed in your right vertebral artery appears to have originated from vertebral artery compression on the right side at C4-C5. This compression is occurring due to degenerative joint disease. You have been referred to Dr. Laurie Lynn, for cervical fusion consideration. Continue soft collar immobilization. Call 911 if you experience any stroke symptoms.   Remember to be fast!

## 2021-07-27 NOTE — PROGRESS NOTES
Neurointerventional Surgery  Ambulatory Progress Note      Patient: Gloria Martinez MRN: [id number]  SSN: [id number]    YOB: 1955  Age: 61 y.o. Sex: female      History of Present Illness:      Yamilex Alvarez presents today for clinical follow-up for right vertebral artery dissection complicated by posterior circulation cerebellar and brainstem strokes. Patient initially presented to [facility] with posterior circulation stroke and thrombus in the cervical right vertebral artery. She was placed on unfractionated heparin and then transition to Eliquis prior to discharge. Since discharge she reports strict compliance with Eliquis 5 mg p.o. twice daily. She reports ongoing, intermittent tingling on the right side of her face (similar during admission). There have been no other transient or focal neurological deficits. She still complains of mild \"neck stiffness\". She is mobilized in a soft c-collar today per neurosurgery's instructions. She has been unable to make an appointment with Dr. Erica Jacinto yet. She underwent CTA of the neck in preparation for today's visit. 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 Objective:     Current Outpatient Medications   Medication Sig Dispense Refill    Eliquis 5 mg tablet Take 5 mg by mouth two (2) times a day.  atorvastatin (LIPITOR) 40 mg tablet Take 40 mg by mouth daily.  thyroid, Pork, (ARMOUR) 90 mg tablet Take 90 mg by mouth daily.  cyanocobalamin (VITAMIN B-12) 1,000 mcg tablet Take 1,000 mcg by mouth daily.  Cholecalciferol, Vitamin D3, 5,000 unit Tab Take  by mouth.       spironolactone (ALDACTONE) 50 mg tablet Take  by mouth daily.      OTHER           Visit Vitals  /80 (BP 1 Location: Left upper arm, BP Patient Position: Sitting, BP Cuff Size: Large adult)   Pulse 73   Temp (!) 96.4 °F (35.8 °C) (Temporal)   Ht 5' 7\" (1.702 m)   Wt 222 lb 3.2 oz (100.8 kg)   LMP 09/22/2013   SpO2 98%   BMI 34.80 kg/m²       Allergies   Allergen Reactions    Demerol [Meperidine] Other (comments)     nightmares    Erythromycin Itching       Current Outpatient Medications   Medication Sig    Eliquis 5 mg tablet Take 5 mg by mouth two (2) times a day.  atorvastatin (LIPITOR) 40 mg tablet Take 40 mg by mouth daily.  thyroid, Pork, (ARMOUR) 90 mg tablet Take 90 mg by mouth daily.  cyanocobalamin (VITAMIN B-12) 1,000 mcg tablet Take 1,000 mcg by mouth daily.  Cholecalciferol, Vitamin D3, 5,000 unit Tab Take  by mouth.  spironolactone (ALDACTONE) 50 mg tablet Take  by mouth daily.  OTHER      No current facility-administered medications for this visit. Physical Exam:  General: NAD  HEENT: Mucous membranes are moist, no tonsillar exudates  Vascular: No cervical vascular bruit  Lungs: clear to auscultation bilaterally  Heart: regular rate and rhythm, S1, S2 normal, no murmur, click, rub or gallop  Extremities: extremities normal, atraumatic, no cyanosis or edema      Neurologic Exam:  Mental Status:  Alert and oriented x 4. Appropriate affect, mood and behavior. Language:    Normal fluency, repetition, comprehension and naming. Cranial Nerves:   Pupils equal, round and reactive to light. Visual fields full to confrontation. No extinction     Extraocular movements intact. Facial sensation intact V1 - V3. Full facial strength, no asymmetry. Hearing intact bilaterally. No dysarthria. Tongue protrudes to midline, palate elevates symmetrically. Shoulder shrug 5/5 bilaterally. Motor:    No pronator drift.       Bulk and tone normal.      5/5 power in all extremities proximally and distally. No involuntary movements. Sensation:    Sensation intact throughout to light touch. No sensory extinction      Coordination & Gait: Normal gait, FTN and rapid alternating movements intact. Tandem gait was not attempted. Labs:  Lab Results   Component Value Date/Time    Sodium 139 06/24/2021 01:17 AM    Potassium 4.0 06/24/2021 01:17 AM    Chloride 106 06/24/2021 01:17 AM    CO2 24 06/24/2021 01:17 AM    Anion gap 9 06/24/2021 01:17 AM    Glucose 101 (H) 06/24/2021 01:17 AM    BUN 12 06/24/2021 01:17 AM    Creatinine 0.97 06/24/2021 01:17 AM    BUN/Creatinine ratio 12 06/24/2021 01:17 AM    GFR est AA >60 06/24/2021 01:17 AM    GFR est non-AA 58 (L) 06/24/2021 01:17 AM    Calcium 9.3 06/24/2021 01:17 AM     Lab Results   Component Value Date/Time    WBC 8.0 06/24/2021 01:17 AM    HGB 14.7 06/24/2021 01:17 AM    HCT 44.5 06/24/2021 01:17 AM    PLATELET 490 25/64/1646 01:17 AM    MCV 92.5 06/24/2021 01:17 AM     Lab Results   Component Value Date/Time    MCH 30.6 06/24/2021 01:17 AM    MCHC 33.0 06/24/2021 01:17 AM    BASOPHILS 1 06/24/2021 01:17 AM    ABS. LYMPHOCYTES 2.8 06/24/2021 01:17 AM    ABS. MONOCYTES 0.8 06/24/2021 01:17 AM    ABS. EOSINOPHILS 0.2 06/24/2021 01:17 AM    ABS. BASOPHILS 0.1 06/24/2021 01:17 AM    Phosphorus 3.9 06/24/2021 01:17 AM    Magnesium 1.9 06/24/2021 01:17 AM       IMAGING:  I independently reviewed the CTA of the neck obtained on 7/23/2021. This demonstrates complete resolution of intraluminal thrombus in the right vertebral artery. At C4-5 on the right side, there is compression of the vertebral artery with approximately 50% stenosis. This results from degenerative subluxation of the facet joint into the foramen transversarium. CTA NECK    Result Date: 7/23/2021  Focal dissection in the V2 segment right vertebral artery is relatively unchanged with greater than 50% luminal narrowing. Resolution of previous intraluminal thrombus.  No other change Assessment/Plan:       ICD-10-CM ICD-9-CM    1. Vertebral artery occlusion, right  I65.01 433.20 REFERRAL TO NEUROSURGERY   2. Dissection of extracranial vertebral artery (HCC)  I77.74 443.24 REFERRAL TO NEUROSURGERY   3. Stenosis of right vertebral artery  I65.01 433.20    4. History of CVA (cerebrovascular accident)  Z86.73 V12.54      Right vertebral artery dissection and compression resulting from degenerative subluxation of the right C4-5 facet joint. Intraluminal thrombus has completely dissolved on Eliquis 5 mg p.o. daily. The neck step is neurosurgery consult for fusion consideration to prevent future vertebral artery dissections and stroke. She was instructed to remain in a soft cervical collar until she sees Dr. Stanley Clinton for further instructions. The patient has made an excellent recovery from multiple small strokes in the posterior circulation (brainstem and cerebellum) with minimal residual neurological complaints (occasional tingling in the right face). Add 81 mg of aspirin p.o. daily. Follow-up in 3 months. We will discuss the long-term anticoagulation plan at that time. Eliquis can be discontinued prior to cervical surgery when needed. This patient should remain on an 81 mg aspirin for life. Follow-up Disposition:    I have discussed the diagnosis with the patient and the intended plan as seen in the above orders. Patient is in agreement. The patient has received an after-visit summary and questions were answered concerning future plans. I have discussed medication side effects and warnings with the patient as well.       Sandip Gonzalez MD

## 2021-07-27 NOTE — PROGRESS NOTES
[facility] follow up presenting with Vertebral artery occlusion. Patient reports occasional episodes of tingling on the right side of her face and neck stiffness. Denies headaches, dizziness, neck pain, blurred or double vision. No acute problems reported. Patient wears a soft cervical collar at night and while working 4 hours a day.

## 2021-09-01 ENCOUNTER — TELEPHONE (OUTPATIENT)
Dept: NEUROSURGERY | Age: 66
End: 2021-09-01

## 2021-09-01 NOTE — TELEPHONE ENCOUNTER
I called Dr. Nestor Posey and the patient to follow up on her spine consultation s/p dissection/stroke. She has decided to transfer her care to [facility] but will keep my office informed of findings.

## 2021-10-22 ENCOUNTER — OFFICE VISIT (OUTPATIENT)
Dept: NEUROSURGERY | Age: 66
End: 2021-10-22
Payer: COMMERCIAL

## 2021-10-22 VITALS
HEIGHT: 67 IN | OXYGEN SATURATION: 98 % | TEMPERATURE: 96.7 F | DIASTOLIC BLOOD PRESSURE: 86 MMHG | HEART RATE: 54 BPM | SYSTOLIC BLOOD PRESSURE: 122 MMHG | WEIGHT: 228 LBS | BODY MASS INDEX: 35.79 KG/M2

## 2021-10-22 DIAGNOSIS — Z86.73 HISTORY OF CVA (CEREBROVASCULAR ACCIDENT): ICD-10-CM

## 2021-10-22 DIAGNOSIS — I77.74 DISSECTION OF EXTRACRANIAL VERTEBRAL ARTERY (HCC): Primary | ICD-10-CM

## 2021-10-22 DIAGNOSIS — I65.01 STENOSIS OF RIGHT VERTEBRAL ARTERY: ICD-10-CM

## 2021-10-22 PROCEDURE — 99214 OFFICE O/P EST MOD 30 MIN: CPT | Performed by: RADIOLOGY

## 2021-10-22 NOTE — PROGRESS NOTES
Follow up for medication review for Vertebral artery occlusion and Dissection. Patient reports continued stiffness on left side of her neck, pressure above right eye and slight tingling on face. Reports she will be seen at the [facility] on 10/25/2021. No acute problems reported.

## 2021-10-22 NOTE — PATIENT INSTRUCTIONS
Your Eliquis dose has been reduced to 2.5 mg p.o. twice daily. Continue 81 mg of aspirin p.o. daily. Please release medical records from [facility] to Dr. Jun Gregory for review after your consultation there next week. Call 911 if you experience any new stroke symptoms. Remember BE FAST! Learning About How Hospitals and Clinics Keep You Safe From COVID-19  Overview     Many hospitals and clinics now are treating people who are infected with COVID-19. So if you're in the hospital or clinic for any other reason, this may be an unsettling time. It's common to be concerned about becoming infected with the virus. But hospitals and clinics have policies to prevent the spread of infections. For example, doctors and nurses are trained to wash their hands before they treat you. Health care centers have stepped up these policies now. They are taking further steps to protect their patients. As long as YPYLN-08 remains a public health problem, things are going to be different when you go to a health care facility. They may have new rules for your safety. These could include having you wear a cloth face cover, meeting you outside the clinic, and having you sit away from others in the waiting room. What are hospitals and clinics doing to keep you safe? Your care team is very aware of the threat of COVID-19. They are doing everything they can to keep you safe. Hospitals and clinics may have different policies. But in general, you may expect many of these measures:  · You will be screened for COVID-19. When you come to the hospital, you may have your temperature taken. You'll be asked about any symptoms, such as a fever, a cough, or shortness of breath. You may be asked if you've had contact with anyone who's been diagnosed with the virus. And you may be asked if you've traveled to any place that has had an outbreak. · The staff may try to do as much as possible outside the facility.  For example, you may be asked to fill out paperwork online or in your car before you come inside. The person giving you a ride home may be asked to wait outside. These new rules to help protect you may make routine tasks take longer than usual.  · People who have COVID-19 are treated in a separate area. Many hospitals and clinics have staff members who treat only these patients. This helps limit the spread of the infection. · Visitors may be limited. In some cases, no visitors are allowed. In others, only one healthy visitor is allowed. Children may be limited to having only one adult with them. You can connect with family and friends using your phone or computer. If you need something brought from home, such as glasses or a phone , find out where the item can be dropped off. · The hospital or clinic follows guidelines to prevent infection. These include:  ? Washing hands often. ? Disinfecting high-touch surfaces. ? Wearing face masks or other protective equipment. ? Making extra space for social distancing. What can you do to stay safe? We all have a role to play in keeping ourselves safe and preventing the spread of COVID-19. Here are some things you can do while you're receiving care. If you're in a hospital, stay in your room. This will limit your exposure to the virus. It may be boring, but it's the safest place for you. Wash your hands often. Use soap and water, and scrub for 20 seconds. Then rinse and dry them well. Always wash them after you use the bathroom, before you eat, and after you cough, sneeze, or blow your nose. If you can't wash your hands, use hand . Speak up if you have safety concerns. Don't be shy to correct health care workers if they aren't washing their hands properly, wearing face masks, or taking other precautions. These actions are vital to prevent the spread of infection. Try to be understanding.    This is a stressful time for everyone, including your care team. They are doing their best to keep you safe and provide you with good care. Where can you learn more? Go to http://www.gray.com/  Enter A134 in the search box to learn more about \"Learning About How Hospitals and [address] Safe From COVID-19. \"  Current as of: March 26, 2021               Content Version: 13.0  © 0206-0040 Healthwise, [facility]. Care instructions adapted under license by [employer] (which disclaims liability or warranty for this information). If you have questions about a medical condition or this instruction, always ask your healthcare professional. [place] [address] any warranty or liability for your use of this information.

## 2022-03-18 PROBLEM — I77.74 DISSECTION OF EXTRACRANIAL VERTEBRAL ARTERY (HCC): Status: ACTIVE | Noted: 2021-07-27

## 2022-03-18 PROBLEM — I65.09 VERTEBRAL ARTERY STENOSIS: Status: ACTIVE | Noted: 2021-07-27

## 2022-03-19 PROBLEM — Z86.73 HISTORY OF CVA (CEREBROVASCULAR ACCIDENT): Status: ACTIVE | Noted: 2021-07-27

## 2022-12-06 ENCOUNTER — OFFICE VISIT (OUTPATIENT)
Dept: GYNECOLOGY | Age: 67
End: 2022-12-06
Payer: COMMERCIAL

## 2022-12-06 VITALS
SYSTOLIC BLOOD PRESSURE: 124 MMHG | HEIGHT: 67 IN | DIASTOLIC BLOOD PRESSURE: 80 MMHG | HEART RATE: 80 BPM | BODY MASS INDEX: 31.86 KG/M2 | WEIGHT: 203 LBS

## 2022-12-06 DIAGNOSIS — C54.1 ENDOMETRIAL CANCER (HCC): Primary | ICD-10-CM

## 2022-12-06 PROCEDURE — 99213 OFFICE O/P EST LOW 20 MIN: CPT | Performed by: OBSTETRICS & GYNECOLOGY

## 2022-12-06 PROCEDURE — 1123F ACP DISCUSS/DSCN MKR DOCD: CPT | Performed by: OBSTETRICS & GYNECOLOGY

## 2022-12-06 NOTE — PROGRESS NOTES
[facility] GYNECOLOGIC ONCOLOGY  [address] [facility], [address], [address]  [phone]      Office Visit    Patient ID:  Name: Kayla Parish  MRN: [id number]  : 1955/67 y.o. Visit date: 2022    Primary Diagnosis:     ICD-10-CM ICD-9-CM    1. 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 She was not recommended any adjuvant therapy. She presents today for routine surveillance. She is doing well and is without complaints. She denies any vaginal bleeding or discharge, any pelvic or abdominal pain, or any changes in her bowel or bladder habits.         PMH:  Past Medical History:   Diagnosis Date    Alopecia     Arthritis     shoulders    Asthma     Cancer ([facility]     ENDOMETRIAL    Edema     Nausea & vomiting     Polyp of colon     Thyroid disease     hypothyroidism     PSH:  Past Surgical History:   Procedure Laterality Date    ENDOSCOPY, COLON, DIAGNOSTIC  3/2010    ENDOSCOPY, COLON, DIAGNOSTIC      3cm polyp    HX CHOLECYSTECTOMY      HX COLONOSCOPY  6/3/15    X1 polyp    HX GYN          HX GYN      btl    HX GYN  13    hysteroscopy/D&C    HX GYN  10/7/13    TLH/BSO    HX HEENT      tonsillectomy    HX HEENT      NASAL SURGERY     SOC:  Social History     Socioeconomic History    Marital status: OTHER     Spouse name: Not on file    Number of children: Not on file    Years of education: Not on file    Highest education level: Not on file   Occupational History    Not on file   Tobacco Use    Smoking status: Never    Smokeless tobacco: Never   Vaping Use    Vaping Use: Never used   Substance and Sexual Activity    Alcohol use: Yes     Comment: RARELY    Drug use: No    Sexual activity: Yes     Partners: Male   Other Topics Concern    Not on file   Social History Narrative    Not on file     Social Determinants of Health     Financial Resource Strain: Not on file   Food Insecurity: Not on file   Transportation Needs: Not on file   Physical Activity: Not on file   Stress: Not on file   Social Connections: Not on file   Intimate Partner Violence: Not on file   Housing Stability: Not on file     Family History  Family History   Problem Relation Age of Onset    Cancer Paternal Grandfather         COLON    Diabetes Father     Heart Disease Father     Hypertension Sister     Diabetes Sister     Lung Disease Sister         [facility]    Thyroid Disease Daughter      Medications:  Current Outpatient Medications on File Prior to Visit   Medication Sig Dispense Refill    apixaban (ELIQUIS) 2.5 mg tablet Take 1 Tablet by mouth two (2) times a day. Indications: prevention for a blood clot going to the brain 60 Tablet 5    atorvastatin (LIPITOR) 40 mg tablet Take 40 mg by mouth daily. aspirin delayed-release 81 mg tablet Take 1 Tablet by mouth daily. Indications: prevention for a blood clot going to the brain 30 Tablet 5    thyroid, Pork, (ARMOUR) 90 mg tablet Take 90 mg by mouth daily. Cholecalciferol, Vitamin D3, 5,000 unit Tab Take  by mouth. spironolactone (ALDACTONE) 50 mg tablet Take  by mouth daily. OTHER  (Patient not taking: No sig reported)      cyanocobalamin (VITAMIN B-12) 1,000 mcg tablet Take 1,000 mcg by mouth daily. No current facility-administered medications on file prior to visit. Allergies:   Allergies   Allergen Reactions    Demerol [Meperidine] Other (comments)     nightmares    Doxycycline Other (comments)     Double Vision     Erythromycin Itching    Tetracycline Other (comments) Double Vision        ROS:  Negative except that mentioned in HPI    OBJECTIVE:    PHYSICAL EXAM  VITAL SIGNS: Vitals:    12/06/22 1438   BP: 124/80   Pulse: 80   Weight: 203 lb (92.1 kg)   Height: 5' 7.01\" (1.702 m)        GENERAL FAUZIA: Conversant, alert, oriented. NAD   HEENT: Normocephalic. Oropharynx clear. Neck: Trachea midline, no JVD, no thyroid enlargement   RESPIRATORY: Lung fields clear to auscultation and percussion. Adequate respiratory effort   CARDIOVASC: RRR without murmur   GASTROINT: soft, non-tender, without masses or organomegaly. No hernia noted   MUSCULOSKEL: Within normal limits   EXTREMITIES: extremities normal, atraumatic, no cyanosis or edema. Pulses appreciated. PELVIC: External genitalia: normal general appearance  Urinary system: urethral meatus normal  Vaginal: normal without tenderness, induration or masses  Cervix: absent  Adnexa: removed surgically  Uterus: absent   RECTAL: Deferred   AXEL SURVEY: Negative throughout---neck, axillae, inguina. NEURO: Grossly intact, no acute deficit. Oriented. Not depressed. Not agitated. IMPRESSION AND PLAN:  Bobo Nicholas has a diagnosis of stage I, grade 2 endometrial cancer. She has no evidence of disease on today's exam  We will see her back in the office in one year for continued surveillance. An electronic signature was used to sign this note.     Des Schulte MD  12/06/22

## 2022-12-06 NOTE — PROGRESS NOTES
Check up for history of endometrial cancer. Pt states no abnormal spotting, bleeding or pain. 1. Have you been to the ER, urgent care clinic since your last visit? Hospitalized since your last visit?no    2. Have you seen or consulted any other health care providers outside of the [address] [place] [address] since your last visit? Include any pap smears or colon screening.  no

## 2023-04-18 ENCOUNTER — HOSPITAL ENCOUNTER (OUTPATIENT)
Dept: GENERAL RADIOLOGY | Age: 68
Discharge: HOME | End: 2023-04-18
Attending: INTERNAL MEDICINE
Payer: COMMERCIAL

## 2023-04-18 ENCOUNTER — TRANSCRIBE ORDER (OUTPATIENT)
Dept: GENERAL RADIOLOGY | Age: 68
End: 2023-04-18

## 2023-04-18 DIAGNOSIS — M25.561 RIGHT KNEE PAIN: ICD-10-CM

## 2023-04-18 DIAGNOSIS — M25.561 RIGHT KNEE PAIN: Primary | ICD-10-CM

## 2023-04-18 PROCEDURE — 73562 X-RAY EXAM OF KNEE 3: CPT

## 2024-03-27 ENCOUNTER — APPOINTMENT (OUTPATIENT)
Facility: HOSPITAL | Age: 69
End: 2024-03-27
Payer: COMMERCIAL

## 2024-03-27 ENCOUNTER — HOSPITAL ENCOUNTER (EMERGENCY)
Facility: HOSPITAL | Age: 69
Discharge: HOME | End: 2024-03-27
Attending: EMERGENCY MEDICINE
Payer: COMMERCIAL

## 2024-03-27 VITALS
WEIGHT: 190 LBS | RESPIRATION RATE: 21 BRPM | OXYGEN SATURATION: 95 % | HEIGHT: 67 IN | BODY MASS INDEX: 29.82 KG/M2 | SYSTOLIC BLOOD PRESSURE: 119 MMHG | TEMPERATURE: 97.9 F | HEART RATE: 87 BPM | DIASTOLIC BLOOD PRESSURE: 60 MMHG

## 2024-03-27 DIAGNOSIS — E86.0 DEHYDRATION: Primary | ICD-10-CM

## 2024-03-27 DIAGNOSIS — R11.2 NAUSEA AND VOMITING, UNSPECIFIED VOMITING TYPE: ICD-10-CM

## 2024-03-27 DIAGNOSIS — R55 SYNCOPE AND COLLAPSE: ICD-10-CM

## 2024-03-27 LAB
ALBUMIN SERPL-MCNC: 4 G/DL (ref 3.5–5)
ALBUMIN/GLOB SERPL: 1.1 (ref 1.1–2.2)
ALP SERPL-CCNC: 65 U/L (ref 45–117)
ALT SERPL-CCNC: 33 U/L (ref 12–78)
ANION GAP SERPL CALC-SCNC: 7 MMOL/L (ref 5–15)
AST SERPL-CCNC: 16 U/L (ref 15–37)
BASOPHILS # BLD: 0 K/UL (ref 0–0.1)
BASOPHILS NFR BLD: 0 % (ref 0–1)
BILIRUB SERPL-MCNC: 0.8 MG/DL (ref 0.2–1)
BUN SERPL-MCNC: 23 MG/DL (ref 6–20)
BUN/CREAT SERPL: 28 (ref 12–20)
CALCIUM SERPL-MCNC: 9.3 MG/DL (ref 8.5–10.1)
CHLORIDE SERPL-SCNC: 106 MMOL/L (ref 97–108)
CO2 SERPL-SCNC: 25 MMOL/L (ref 21–32)
CREAT SERPL-MCNC: 0.83 MG/DL (ref 0.55–1.02)
DIFFERENTIAL METHOD BLD: ABNORMAL
EOSINOPHIL # BLD: 0 K/UL (ref 0–0.4)
EOSINOPHIL NFR BLD: 0 % (ref 0–7)
ERYTHROCYTE [DISTWIDTH] IN BLOOD BY AUTOMATED COUNT: 13.2 % (ref 11.5–14.5)
GLOBULIN SER CALC-MCNC: 3.5 G/DL (ref 2–4)
GLUCOSE SERPL-MCNC: 141 MG/DL (ref 65–100)
HCT VFR BLD AUTO: 47.9 % (ref 35–47)
HGB BLD-MCNC: 15.9 G/DL (ref 11.5–16)
IMM GRANULOCYTES # BLD AUTO: 0 K/UL (ref 0–0.04)
IMM GRANULOCYTES NFR BLD AUTO: 0 % (ref 0–0.5)
LYMPHOCYTES # BLD: 0.5 K/UL (ref 0.8–3.5)
LYMPHOCYTES NFR BLD: 4 % (ref 12–49)
MAGNESIUM SERPL-MCNC: 1.9 MG/DL (ref 1.6–2.4)
MCH RBC QN AUTO: 30.5 PG (ref 26–34)
MCHC RBC AUTO-ENTMCNC: 33.2 G/DL (ref 30–36.5)
MCV RBC AUTO: 91.9 FL (ref 80–99)
MONOCYTES # BLD: 0.3 K/UL (ref 0–1)
MONOCYTES NFR BLD: 3 % (ref 5–13)
NEUTS SEG # BLD: 10.7 K/UL (ref 1.8–8)
NEUTS SEG NFR BLD: 93 % (ref 32–75)
NRBC # BLD: 0 K/UL (ref 0–0.01)
NRBC BLD-RTO: 0 PER 100 WBC
PLATELET # BLD AUTO: 206 K/UL (ref 150–400)
PMV BLD AUTO: 10.6 FL (ref 8.9–12.9)
POTASSIUM SERPL-SCNC: 4 MMOL/L (ref 3.5–5.1)
PROT SERPL-MCNC: 7.5 G/DL (ref 6.4–8.2)
RBC # BLD AUTO: 5.21 M/UL (ref 3.8–5.2)
RBC MORPH BLD: ABNORMAL
SODIUM SERPL-SCNC: 138 MMOL/L (ref 136–145)
TROPONIN I SERPL HS-MCNC: <4 NG/L (ref 0–51)
WBC # BLD AUTO: 11.5 K/UL (ref 3.6–11)

## 2024-03-27 PROCEDURE — 80053 COMPREHEN METABOLIC PANEL: CPT

## 2024-03-27 PROCEDURE — 93005 ELECTROCARDIOGRAM TRACING: CPT | Performed by: EMERGENCY MEDICINE

## 2024-03-27 PROCEDURE — 83735 ASSAY OF MAGNESIUM: CPT

## 2024-03-27 PROCEDURE — 96374 THER/PROPH/DIAG INJ IV PUSH: CPT

## 2024-03-27 PROCEDURE — 2580000003 HC RX 258: Performed by: EMERGENCY MEDICINE

## 2024-03-27 PROCEDURE — 84484 ASSAY OF TROPONIN QUANT: CPT

## 2024-03-27 PROCEDURE — 70450 CT HEAD/BRAIN W/O DYE: CPT

## 2024-03-27 PROCEDURE — 36415 COLL VENOUS BLD VENIPUNCTURE: CPT

## 2024-03-27 PROCEDURE — 96361 HYDRATE IV INFUSION ADD-ON: CPT

## 2024-03-27 PROCEDURE — 96375 TX/PRO/DX INJ NEW DRUG ADDON: CPT

## 2024-03-27 PROCEDURE — 85025 COMPLETE CBC W/AUTO DIFF WBC: CPT

## 2024-03-27 PROCEDURE — 6360000002 HC RX W HCPCS: Performed by: EMERGENCY MEDICINE

## 2024-03-27 PROCEDURE — 99284 EMERGENCY DEPT VISIT MOD MDM: CPT

## 2024-03-27 RX ORDER — 0.9 % SODIUM CHLORIDE 0.9 %
1000 INTRAVENOUS SOLUTION INTRAVENOUS ONCE
Status: COMPLETED | OUTPATIENT
Start: 2024-03-27 | End: 2024-03-27

## 2024-03-27 RX ORDER — ONDANSETRON 4 MG/1
4 TABLET, ORALLY DISINTEGRATING ORAL 3 TIMES DAILY PRN
Qty: 21 TABLET | Refills: 0 | Status: SHIPPED | OUTPATIENT
Start: 2024-03-27

## 2024-03-27 RX ORDER — ONDANSETRON 2 MG/ML
4 INJECTION INTRAMUSCULAR; INTRAVENOUS ONCE
Status: COMPLETED | OUTPATIENT
Start: 2024-03-27 | End: 2024-03-27

## 2024-03-27 RX ORDER — KETOROLAC TROMETHAMINE 30 MG/ML
30 INJECTION, SOLUTION INTRAMUSCULAR; INTRAVENOUS
Status: COMPLETED | OUTPATIENT
Start: 2024-03-27 | End: 2024-03-27

## 2024-03-27 RX ADMIN — ONDANSETRON 4 MG: 2 INJECTION INTRAMUSCULAR; INTRAVENOUS at 17:43

## 2024-03-27 RX ADMIN — KETOROLAC TROMETHAMINE 30 MG: 30 INJECTION, SOLUTION INTRAMUSCULAR at 19:55

## 2024-03-27 RX ADMIN — SODIUM CHLORIDE 1000 ML: 9 INJECTION, SOLUTION INTRAVENOUS at 17:46

## 2024-03-27 RX ADMIN — SODIUM CHLORIDE 1000 ML: 9 INJECTION, SOLUTION INTRAVENOUS at 19:01

## 2024-03-27 ASSESSMENT — PAIN SCALES - GENERAL: PAINLEVEL_OUTOF10: 3

## 2024-03-27 ASSESSMENT — PAIN - FUNCTIONAL ASSESSMENT: PAIN_FUNCTIONAL_ASSESSMENT: NONE - DENIES PAIN

## 2024-03-27 ASSESSMENT — PAIN DESCRIPTION - LOCATION: LOCATION: HEAD

## 2024-03-27 NOTE — ED TRIAGE NOTES
Pt arrives via EMS w/ c/c of vomiting starting this am. Pt also reports \"passing out 2 times\" and one of the times she did hit her head on the floor.    Pt is on Eliquis.

## 2024-03-27 NOTE — ED NOTES
Pt returned from CT    Orthostatics performed & documented in flowsheets.    Pt reports she is feeling better.     @ bedside.    Call light w/in reach.

## 2024-03-27 NOTE — ED NOTES
Bedside and Verbal shift change report given to Shilpa RN (oncoming nurse) by Toya RN (offgoing nurse). Report included the following information Nurse Handoff Report, Index, ED Encounter Summary, ED SBAR, Adult Overview, Intake/Output, and Recent Results.

## 2024-03-27 NOTE — ED PROVIDER NOTES
[facility] EMERGENCY DEPT  EMERGENCY DEPARTMENT ENCOUNTER      Patient Name: Vivienne Fuentes  MRN: [id number]  Birthdate 1955  Date of Evaluation: 3/27/2024  Physician: Rod Warner MD    CHIEF COMPLAINT       Chief Complaint   Patient presents with    Emesis    Loss of Consciousness       HISTORY OF PRESENT ILLNESS   (Location/Symptom, Timing/Onset, Context/Setting, Quality, Duration, Modifying Factors, Severity)   Vivienne Fuentes, 68 y.o., female     68-year-old female presents with chief complaint of vomiting and syncope.  Patient reports that she has been vomiting since around 4 AM this morning.  She states that she tends to pass out when she vomits.  Her  had similar symptoms over the weekend.  She denies abdominal pain.  She did hit her head after syncopized and once today in the bathroom.  She does complain of some pain around her temples.          Nursing Notes were reviewed.    REVIEW OF SYSTEMS    (Not required)   Review of Systems    Except as noted above the remainder of the review of systems was reviewed and negative.     PAST MEDICAL HISTORY     Past Medical History:   Diagnosis Date    Alopecia     Arthritis     shoulders    Asthma     Cancer (HCC)     ENDOMETRIAL    Edema     Nausea & vomiting     Polyp of colon     Thyroid disease     hypothyroidism       SURGICAL HISTORY       Past Surgical History:   Procedure Laterality Date    CHOLECYSTECTOMY      COLONOSCOPY      3cm polyp    COLONOSCOPY  6/3/15    X1 polyp    COLONOSCOPY  3/2010    GYN  1988        GYN  10/7/13    TLH/BSO    GYN  13    hysteroscopy/D&C    GYN      btl    HEENT      NASAL SURGERY    HEENT      tonsillectomy       CURRENT MEDICATIONS       Previous Medications    APIXABAN (ELIQUIS) 2.5 MG TABS TABLET    Take 2.5 mg by mouth 2 times daily    ASPIRIN 81 MG EC TABLET    Take 81 mg by mouth daily    ATORVASTATIN (LIPITOR) 40 MG TABLET    Take 40 mg by mouth daily    CYANOCOBALAMIN 1000 MCG TABLET

## 2024-03-28 LAB
EKG ATRIAL RATE: 91 BPM
EKG DIAGNOSIS: NORMAL
EKG P AXIS: 29 DEGREES
EKG P-R INTERVAL: 180 MS
EKG Q-T INTERVAL: 382 MS
EKG QRS DURATION: 76 MS
EKG QTC CALCULATION (BAZETT): 469 MS
EKG R AXIS: -14 DEGREES
EKG T AXIS: 52 DEGREES
EKG VENTRICULAR RATE: 91 BPM
